# Patient Record
Sex: FEMALE | Race: WHITE | NOT HISPANIC OR LATINO | URBAN - METROPOLITAN AREA
[De-identification: names, ages, dates, MRNs, and addresses within clinical notes are randomized per-mention and may not be internally consistent; named-entity substitution may affect disease eponyms.]

---

## 2019-01-15 ENCOUNTER — INPATIENT (INPATIENT)
Facility: HOSPITAL | Age: 54
LOS: 1 days | Discharge: HOME | End: 2019-01-17
Attending: HOSPITALIST | Admitting: HOSPITALIST

## 2019-01-15 VITALS
HEART RATE: 104 BPM | RESPIRATION RATE: 18 BRPM | DIASTOLIC BLOOD PRESSURE: 91 MMHG | TEMPERATURE: 98 F | OXYGEN SATURATION: 98 % | SYSTOLIC BLOOD PRESSURE: 141 MMHG

## 2019-01-15 LAB
ALBUMIN SERPL ELPH-MCNC: 3.8 G/DL — SIGNIFICANT CHANGE UP (ref 3.5–5.2)
ALP SERPL-CCNC: 63 U/L — SIGNIFICANT CHANGE UP (ref 30–115)
ALT FLD-CCNC: 15 U/L — SIGNIFICANT CHANGE UP (ref 0–41)
ANION GAP SERPL CALC-SCNC: 16 MMOL/L — HIGH (ref 7–14)
AST SERPL-CCNC: 25 U/L — SIGNIFICANT CHANGE UP (ref 0–41)
BASOPHILS # BLD AUTO: 0.02 K/UL — SIGNIFICANT CHANGE UP (ref 0–0.2)
BASOPHILS NFR BLD AUTO: 0.4 % — SIGNIFICANT CHANGE UP (ref 0–1)
BILIRUB SERPL-MCNC: 0.5 MG/DL — SIGNIFICANT CHANGE UP (ref 0.2–1.2)
BUN SERPL-MCNC: 10 MG/DL — SIGNIFICANT CHANGE UP (ref 10–20)
CALCIUM SERPL-MCNC: 8.8 MG/DL — SIGNIFICANT CHANGE UP (ref 8.5–10.1)
CHLORIDE SERPL-SCNC: 92 MMOL/L — LOW (ref 98–110)
CO2 SERPL-SCNC: 23 MMOL/L — SIGNIFICANT CHANGE UP (ref 17–32)
CREAT SERPL-MCNC: 0.6 MG/DL — LOW (ref 0.7–1.5)
EOSINOPHIL # BLD AUTO: 0.02 K/UL — SIGNIFICANT CHANGE UP (ref 0–0.7)
EOSINOPHIL NFR BLD AUTO: 0.4 % — SIGNIFICANT CHANGE UP (ref 0–8)
GLUCOSE SERPL-MCNC: 110 MG/DL — HIGH (ref 70–99)
HCT VFR BLD CALC: 39.1 % — SIGNIFICANT CHANGE UP (ref 37–47)
HGB BLD-MCNC: 12.9 G/DL — SIGNIFICANT CHANGE UP (ref 12–16)
IMM GRANULOCYTES NFR BLD AUTO: 0.4 % — HIGH (ref 0.1–0.3)
LYMPHOCYTES # BLD AUTO: 0.58 K/UL — LOW (ref 1.2–3.4)
LYMPHOCYTES # BLD AUTO: 10.8 % — LOW (ref 20.5–51.1)
MAGNESIUM SERPL-MCNC: 1.7 MG/DL — LOW (ref 1.8–2.4)
MCHC RBC-ENTMCNC: 25.4 PG — LOW (ref 27–31)
MCHC RBC-ENTMCNC: 33 G/DL — SIGNIFICANT CHANGE UP (ref 32–37)
MCV RBC AUTO: 77 FL — LOW (ref 81–99)
MONOCYTES # BLD AUTO: 0.51 K/UL — SIGNIFICANT CHANGE UP (ref 0.1–0.6)
MONOCYTES NFR BLD AUTO: 9.5 % — HIGH (ref 1.7–9.3)
NEUTROPHILS # BLD AUTO: 4.24 K/UL — SIGNIFICANT CHANGE UP (ref 1.4–6.5)
NEUTROPHILS NFR BLD AUTO: 78.5 % — HIGH (ref 42.2–75.2)
NT-PROBNP SERPL-SCNC: 1970 PG/ML — HIGH (ref 0–300)
PLATELET # BLD AUTO: 251 K/UL — SIGNIFICANT CHANGE UP (ref 130–400)
POTASSIUM SERPL-MCNC: 4.2 MMOL/L — SIGNIFICANT CHANGE UP (ref 3.5–5)
POTASSIUM SERPL-SCNC: 4.2 MMOL/L — SIGNIFICANT CHANGE UP (ref 3.5–5)
PROT SERPL-MCNC: 7.6 G/DL — SIGNIFICANT CHANGE UP (ref 6–8)
RBC # BLD: 5.08 M/UL — SIGNIFICANT CHANGE UP (ref 4.2–5.4)
RBC # FLD: 14.9 % — HIGH (ref 11.5–14.5)
SODIUM SERPL-SCNC: 131 MMOL/L — LOW (ref 135–146)
TROPONIN T SERPL-MCNC: <0.01 NG/ML — SIGNIFICANT CHANGE UP
WBC # BLD: 5.39 K/UL — SIGNIFICANT CHANGE UP (ref 4.8–10.8)
WBC # FLD AUTO: 5.39 K/UL — SIGNIFICANT CHANGE UP (ref 4.8–10.8)

## 2019-01-15 RX ORDER — IPRATROPIUM/ALBUTEROL SULFATE 18-103MCG
3 AEROSOL WITH ADAPTER (GRAM) INHALATION ONCE
Qty: 0 | Refills: 0 | Status: COMPLETED | OUTPATIENT
Start: 2019-01-15 | End: 2019-01-15

## 2019-01-15 RX ORDER — MAGNESIUM SULFATE 500 MG/ML
2 VIAL (ML) INJECTION ONCE
Qty: 0 | Refills: 0 | Status: COMPLETED | OUTPATIENT
Start: 2019-01-15 | End: 2019-01-15

## 2019-01-15 RX ORDER — ASPIRIN/CALCIUM CARB/MAGNESIUM 324 MG
325 TABLET ORAL ONCE
Qty: 0 | Refills: 0 | Status: COMPLETED | OUTPATIENT
Start: 2019-01-15 | End: 2019-01-15

## 2019-01-15 RX ADMIN — Medication 50 GRAM(S): at 23:45

## 2019-01-15 RX ADMIN — Medication 3 MILLILITER(S): at 18:56

## 2019-01-15 RX ADMIN — Medication 125 MILLIGRAM(S): at 18:57

## 2019-01-15 RX ADMIN — Medication 3 MILLILITER(S): at 18:05

## 2019-01-15 RX ADMIN — Medication 325 MILLIGRAM(S): at 23:45

## 2019-01-15 RX ADMIN — Medication 3 MILLILITER(S): at 18:22

## 2019-01-15 NOTE — ED PROVIDER NOTE - OBJECTIVE STATEMENT
Pt is a 52 y/o female with hx of HTN, CAD s/p CABG, hypothyroidism, presents to ED for cough for 10 days, mild. Pt also notes mild SOB. + GÓMEZ, + orthopnea. + congestion, Tmax 101.1. No chest pain, abd pain, n/v/d. Pt was seen by PCP last week, just completed 5 day course of zithromax, but still c/o cough and SOB so came in for eval.

## 2019-01-15 NOTE — ED PROVIDER NOTE - MEDICAL DECISION MAKING DETAILS
53 y.o. female, PMH of HTN, CAD s/p CABG, hypothyroidism, c/p 9 day h/o cough. Finished a course of Z-pack with no relief. Today developed fever of 101F. No CP, abdominal pain, leg pain. Reports some SOB on exertion. On exam, pt in NAD, AAOx3, head NC/AT, CN II-XII intact, lungs diffuse wheezing B/L, CV S1S2 regular, abdomen soft/NT/ND/(+)BS, ext (-) edema. Labs/ CT/CXR reviewed. Findings suspicious for malignancy. Will admit for further work up.

## 2019-01-15 NOTE — ED PROVIDER NOTE - PHYSICAL EXAMINATION
Constitutional: Well developed, well nourished. NAD.  Head: Normocephalic, atraumatic.  Eyes: PERRL. EOMI.  ENT: No nasal discharge. Mucous membranes moist.  Neck: Supple. Painless ROM.  Cardiovascular: Normal S1, S2. Regular rate and rhythm. No murmurs, rubs, or gallops.  Pulmonary: Speaking in full sentences. Normal effort. Mild tachypnea. + scattered wheezing.   Abdominal: Soft. Nondistended. Nontender. No rebound, guarding, rigidity.  Extremities. Pelvis stable. Bilateral 1+ lower extremity edema, pt states is chronic.  Skin: No rashes, cyanosis.  Neuro: AAOx3. No focal neurological deficits.  Psych: Normal mood. Normal affect.

## 2019-01-15 NOTE — ED ADULT NURSE NOTE - NSIMPLEMENTINTERV_GEN_ALL_ED
Implemented All Universal Safety Interventions:  [place] to call system. Call bell, personal items and telephone within reach. Instruct patient to call for assistance. Room bathroom lighting operational. Non-slip footwear when patient is off stretcher. Physically safe environment: no spills, clutter or unnecessary equipment. Stretcher in lowest position, wheels locked, appropriate side rails in place.

## 2019-01-15 NOTE — ED PROVIDER NOTE - ATTENDING CONTRIBUTION TO CARE
53 y.o. female, PMH of HTN, CAD s/p CABG, hypothyroidism, c/p 9 day h/o cough. Finished a course of Z-pack with no relief. Today developed fever of 101F. No CP, abdominal pain, leg pain. Reports some SOB on exertion. On exam, pt in NAD, AAOx3, head NC/AT, CN II-XII intact, lungs diffuse wheezing B/L, CV S1S2 regular, abdomen soft/NT/ND/(+)BS, ext (-) edema. Will do labs, CXR, EKG and reevaluate.

## 2019-01-16 LAB
ALBUMIN SERPL ELPH-MCNC: 4 G/DL — SIGNIFICANT CHANGE UP (ref 3.5–5.2)
ALP SERPL-CCNC: 64 U/L — SIGNIFICANT CHANGE UP (ref 30–115)
ALT FLD-CCNC: 17 U/L — SIGNIFICANT CHANGE UP (ref 0–41)
ANION GAP SERPL CALC-SCNC: 18 MMOL/L — HIGH (ref 7–14)
APTT BLD: 29.8 SEC — SIGNIFICANT CHANGE UP (ref 27–39.2)
AST SERPL-CCNC: 25 U/L — SIGNIFICANT CHANGE UP (ref 0–41)
BASOPHILS # BLD AUTO: 0 K/UL — SIGNIFICANT CHANGE UP (ref 0–0.2)
BASOPHILS NFR BLD AUTO: 0 % — SIGNIFICANT CHANGE UP (ref 0–1)
BILIRUB SERPL-MCNC: 0.3 MG/DL — SIGNIFICANT CHANGE UP (ref 0.2–1.2)
BUN SERPL-MCNC: 11 MG/DL — SIGNIFICANT CHANGE UP (ref 10–20)
CALCIUM SERPL-MCNC: 9.3 MG/DL — SIGNIFICANT CHANGE UP (ref 8.5–10.1)
CHLORIDE SERPL-SCNC: 95 MMOL/L — LOW (ref 98–110)
CO2 SERPL-SCNC: 23 MMOL/L — SIGNIFICANT CHANGE UP (ref 17–32)
CREAT SERPL-MCNC: 0.7 MG/DL — SIGNIFICANT CHANGE UP (ref 0.7–1.5)
EOSINOPHIL # BLD AUTO: 0 K/UL — SIGNIFICANT CHANGE UP (ref 0–0.7)
EOSINOPHIL NFR BLD AUTO: 0 % — SIGNIFICANT CHANGE UP (ref 0–8)
GLUCOSE SERPL-MCNC: 213 MG/DL — HIGH (ref 70–99)
HCT VFR BLD CALC: 43.9 % — SIGNIFICANT CHANGE UP (ref 37–47)
HGB BLD-MCNC: 14.1 G/DL — SIGNIFICANT CHANGE UP (ref 12–16)
IMM GRANULOCYTES NFR BLD AUTO: 0.4 % — HIGH (ref 0.1–0.3)
INR BLD: 1.05 RATIO — SIGNIFICANT CHANGE UP (ref 0.65–1.3)
LACTATE SERPL-SCNC: 2.1 MMOL/L — SIGNIFICANT CHANGE UP (ref 0.5–2.2)
LYMPHOCYTES # BLD AUTO: 0.47 K/UL — LOW (ref 1.2–3.4)
LYMPHOCYTES # BLD AUTO: 19.9 % — LOW (ref 20.5–51.1)
MAGNESIUM SERPL-MCNC: 2.3 MG/DL — SIGNIFICANT CHANGE UP (ref 1.8–2.4)
MCHC RBC-ENTMCNC: 25 PG — LOW (ref 27–31)
MCHC RBC-ENTMCNC: 32.1 G/DL — SIGNIFICANT CHANGE UP (ref 32–37)
MCV RBC AUTO: 78 FL — LOW (ref 81–99)
MONOCYTES # BLD AUTO: 0.08 K/UL — LOW (ref 0.1–0.6)
MONOCYTES NFR BLD AUTO: 3.4 % — SIGNIFICANT CHANGE UP (ref 1.7–9.3)
NEUTROPHILS # BLD AUTO: 1.8 K/UL — SIGNIFICANT CHANGE UP (ref 1.4–6.5)
NEUTROPHILS NFR BLD AUTO: 76.3 % — HIGH (ref 42.2–75.2)
NRBC # BLD: 0 /100 WBCS — SIGNIFICANT CHANGE UP (ref 0–0)
PLATELET # BLD AUTO: 277 K/UL — SIGNIFICANT CHANGE UP (ref 130–400)
POTASSIUM SERPL-MCNC: 4.6 MMOL/L — SIGNIFICANT CHANGE UP (ref 3.5–5)
POTASSIUM SERPL-SCNC: 4.6 MMOL/L — SIGNIFICANT CHANGE UP (ref 3.5–5)
PROT SERPL-MCNC: 8.1 G/DL — HIGH (ref 6–8)
PROTHROM AB SERPL-ACNC: 12.1 SEC — SIGNIFICANT CHANGE UP (ref 9.95–12.87)
RBC # BLD: 5.63 M/UL — HIGH (ref 4.2–5.4)
RBC # FLD: 15.1 % — HIGH (ref 11.5–14.5)
SODIUM SERPL-SCNC: 136 MMOL/L — SIGNIFICANT CHANGE UP (ref 135–146)
WBC # BLD: 2.36 K/UL — LOW (ref 4.8–10.8)
WBC # FLD AUTO: 2.36 K/UL — LOW (ref 4.8–10.8)

## 2019-01-16 RX ORDER — LEVOTHYROXINE SODIUM 125 MCG
25 TABLET ORAL DAILY
Qty: 0 | Refills: 0 | Status: DISCONTINUED | OUTPATIENT
Start: 2019-01-16 | End: 2019-01-17

## 2019-01-16 RX ORDER — ENOXAPARIN SODIUM 100 MG/ML
140 INJECTION SUBCUTANEOUS
Qty: 0 | Refills: 0 | Status: DISCONTINUED | OUTPATIENT
Start: 2019-01-16 | End: 2019-01-17

## 2019-01-16 RX ORDER — CHLORHEXIDINE GLUCONATE 213 G/1000ML
1 SOLUTION TOPICAL
Qty: 0 | Refills: 0 | Status: DISCONTINUED | OUTPATIENT
Start: 2019-01-16 | End: 2019-01-17

## 2019-01-16 RX ORDER — AZITHROMYCIN 500 MG/1
500 TABLET, FILM COATED ORAL ONCE
Qty: 0 | Refills: 0 | Status: COMPLETED | OUTPATIENT
Start: 2019-01-16 | End: 2019-01-16

## 2019-01-16 RX ORDER — AZITHROMYCIN 500 MG/1
TABLET, FILM COATED ORAL
Qty: 0 | Refills: 0 | Status: DISCONTINUED | OUTPATIENT
Start: 2019-01-16 | End: 2019-01-17

## 2019-01-16 RX ORDER — METOPROLOL TARTRATE 50 MG
25 TABLET ORAL EVERY 6 HOURS
Qty: 0 | Refills: 0 | Status: DISCONTINUED | OUTPATIENT
Start: 2019-01-16 | End: 2019-01-17

## 2019-01-16 RX ORDER — IPRATROPIUM/ALBUTEROL SULFATE 18-103MCG
3 AEROSOL WITH ADAPTER (GRAM) INHALATION EVERY 6 HOURS
Qty: 0 | Refills: 0 | Status: DISCONTINUED | OUTPATIENT
Start: 2019-01-16 | End: 2019-01-17

## 2019-01-16 RX ORDER — CEFTRIAXONE 500 MG/1
INJECTION, POWDER, FOR SOLUTION INTRAMUSCULAR; INTRAVENOUS
Qty: 0 | Refills: 0 | Status: DISCONTINUED | OUTPATIENT
Start: 2019-01-16 | End: 2019-01-17

## 2019-01-16 RX ORDER — AZITHROMYCIN 500 MG/1
500 TABLET, FILM COATED ORAL EVERY 24 HOURS
Qty: 0 | Refills: 0 | Status: DISCONTINUED | OUTPATIENT
Start: 2019-01-17 | End: 2019-01-17

## 2019-01-16 RX ORDER — CEFTRIAXONE 500 MG/1
1 INJECTION, POWDER, FOR SOLUTION INTRAMUSCULAR; INTRAVENOUS ONCE
Qty: 0 | Refills: 0 | Status: COMPLETED | OUTPATIENT
Start: 2019-01-16 | End: 2019-01-16

## 2019-01-16 RX ORDER — CEFTRIAXONE 500 MG/1
1 INJECTION, POWDER, FOR SOLUTION INTRAMUSCULAR; INTRAVENOUS EVERY 24 HOURS
Qty: 0 | Refills: 0 | Status: DISCONTINUED | OUTPATIENT
Start: 2019-01-17 | End: 2019-01-17

## 2019-01-16 RX ORDER — ACETAMINOPHEN 500 MG
650 TABLET ORAL EVERY 6 HOURS
Qty: 0 | Refills: 0 | Status: DISCONTINUED | OUTPATIENT
Start: 2019-01-16 | End: 2019-01-17

## 2019-01-16 RX ORDER — SIMVASTATIN 20 MG/1
20 TABLET, FILM COATED ORAL AT BEDTIME
Qty: 0 | Refills: 0 | Status: DISCONTINUED | OUTPATIENT
Start: 2019-01-16 | End: 2019-01-17

## 2019-01-16 RX ADMIN — Medication 60 MILLIGRAM(S): at 21:46

## 2019-01-16 RX ADMIN — Medication 25 MILLIGRAM(S): at 12:17

## 2019-01-16 RX ADMIN — SIMVASTATIN 20 MILLIGRAM(S): 20 TABLET, FILM COATED ORAL at 21:46

## 2019-01-16 RX ADMIN — Medication 3 MILLILITER(S): at 20:26

## 2019-01-16 RX ADMIN — ENOXAPARIN SODIUM 140 MILLIGRAM(S): 100 INJECTION SUBCUTANEOUS at 17:14

## 2019-01-16 RX ADMIN — AZITHROMYCIN 255 MILLIGRAM(S): 500 TABLET, FILM COATED ORAL at 06:24

## 2019-01-16 RX ADMIN — ENOXAPARIN SODIUM 140 MILLIGRAM(S): 100 INJECTION SUBCUTANEOUS at 07:19

## 2019-01-16 RX ADMIN — Medication 25 MILLIGRAM(S): at 17:13

## 2019-01-16 RX ADMIN — Medication 25 MILLIGRAM(S): at 06:24

## 2019-01-16 RX ADMIN — CEFTRIAXONE 100 GRAM(S): 500 INJECTION, POWDER, FOR SOLUTION INTRAMUSCULAR; INTRAVENOUS at 01:37

## 2019-01-16 RX ADMIN — Medication 25 MICROGRAM(S): at 06:24

## 2019-01-16 RX ADMIN — Medication 25 MILLIGRAM(S): at 23:32

## 2019-01-16 RX ADMIN — AZITHROMYCIN 255 MILLIGRAM(S): 500 TABLET, FILM COATED ORAL at 23:25

## 2019-01-16 NOTE — CONSULT NOTE ADULT - SUBJECTIVE AND OBJECTIVE BOX
Patient is a 53y old  Female who presents with a chief complaint of shortness of breath and cough (16 Jan 2019 00:19)      HPI:  53 y.o. f w/ PMHx of HTN, CAD (s/p CABG), Hypothyroidism, DLD presents w/ a chief complaint of nonresolving dry nonproductive cough and shortness of breath on exertion associated w/ fevers. Patient had a temp of 101F at home. Patient went to go see PMD last week and completed the z-pack but still had no resolution of symptoms.     In ED- CXR showed cavitary lesions so a CT Chest was done. It showed findings are concerning for malignancy such as small cell carcinoma and a right hilar lymph node with likely necrotic center as described above correlates with chest x-ray findings. In addition, found to be in new-onset afib w/ rvr. (16 Jan 2019 00:19)      PAST MEDICAL & SURGICAL HISTORY:  Hypothyroid  High cholesterol  Heart disease: TRIPLE BYPASS  No significant past surgical history      SOCIAL HX:   Smoking ex-smoker                        ETOH                            Other    FAMILY HISTORY:  Family history of lung cancer (Uncle)  .  No cardiovascular or pulmonary family history     Review of System:  See HPI    Allergies    No Known Allergies    Intolerances          PHYSICAL EXAM  Vital Signs Last 24 Hrs  T(C): 35.7 (16 Jan 2019 07:46), Max: 36.7 (15 Deandre 2019 23:38)  T(F): 96.3 (16 Jan 2019 07:46), Max: 98 (15 Deandre 2019 23:38)  HR: 56 (16 Jan 2019 07:46) (56 - 104)  BP: 119/75 (16 Jan 2019 07:46) (113/73 - 151/83)  RR: 18 (16 Jan 2019 07:46) (18 - 20)  SpO2: 97% (16 Jan 2019 07:46) (97% - 98%)    General: In NAD  HEENT: STEVE             Lymphatic system: No cervical LN     Lungs: Bilateral rhonchi and wheezing  Cardiovascular: Regular  Gastrointestinal: Soft.  + BS   Musculoskeletal: No Clubbing.  Full range of motion.. Moves all extremities  Skin: Warm.  Intact  Neurological: No motor or sensory deficit       LABS:                          14.1   2.36  )-----------( 277      ( 16 Jan 2019 07:07 )             43.9                                               01-16    136  |  95<L>  |  11  ----------------------------<  213<H>  4.6   |  23  |  0.7    Ca    9.3      16 Jan 2019 07:07  Mg     2.3     01-16    TPro  8.1<H>  /  Alb  4.0  /  TBili  0.3  /  DBili  x   /  AST  25  /  ALT  17  /  AlkPhos  64  01-16      PT/INR - ( 16 Jan 2019 07:07 )   PT: 12.10 sec;   INR: 1.05 ratio         PTT - ( 16 Jan 2019 07:07 )  PTT:29.8 sec                                           CARDIAC MARKERS ( 15 Deandre 2019 18:30 )  x     / <0.01 ng/mL / x     / x     / x                                                LIVER FUNCTIONS - ( 16 Jan 2019 07:07 )  Alb: 4.0 g/dL / Pro: 8.1 g/dL / ALK PHOS: 64 U/L / ALT: 17 U/L / AST: 25 U/L / GGT: x                                                                                                MEDICATIONS  (STANDING):  ALBUTerol/ipratropium for Nebulization 3 milliLiter(s) Nebulizer every 6 hours  azithromycin  IVPB      cefTRIAXone   IVPB      chlorhexidine 2% Cloths 1 Application(s) Topical <User Schedule>  enoxaparin Injectable 140 milliGRAM(s) SubCutaneous two times a day  levothyroxine 25 MICROGram(s) Oral daily  metoprolol tartrate 25 milliGRAM(s) Oral every 6 hours  simvastatin 20 milliGRAM(s) Oral at bedtime    MEDICATIONS  (PRN):  acetaminophen   Tablet .. 650 milliGRAM(s) Oral every 6 hours PRN Temp greater or equal to 38C (100.4F)

## 2019-01-16 NOTE — H&P ADULT - NSHPPHYSICALEXAM_GEN_ALL_CORE
Constitutional: Well developed, well nourished. NAD.  Head: Normocephalic, atraumatic.  Eyes: PERRL. EOMI.  ENT: No nasal discharge. Mucous membranes moist.  Neck: Supple. Painless ROM.  Cardiovascular: Normal S1, S2. Regular rate and rhythm. No murmurs, rubs, or gallops.  Pulmonary: Speaking in full sentences. Normal effort. CTA B/L   Abdominal: Soft. Nondistended. Nontender. No rebound, guarding, rigidity.  Extremities. Pelvis stable. Bilateral 1+ lower extremity edema, pt states is chronic.  Skin: No rashes, cyanosis.  Neuro: AAOx3. No focal neurological deficits.  Psych: Normal mood. Normal affect.

## 2019-01-16 NOTE — H&P ADULT - ASSESSMENT
53 y.o. f w/ PMHx of HTN, CAD (s/p CABG), Hypothyroidism, DLD presents w/ a chief complaint of nonresolving dry nonproductive cough and shortness of breath on exertion associated w/ fevers. CT Chest in ED showed findings concerning for malignancy.    #) R/o Lung Ca  -CT Chest (01/15/2019)- 1.  Extensive mediastinal, bilateral hilar, and right supraclavicular lymphadenopathy as described above. Additionally, there is narrowing of   the right upper lobe bronchus. Findings are concerning for malignancy such as small cell carcinoma. PET/CT recommended for further evaluation.2.  Right hilar lymph node with likely necrotic center as described above correlates with chest x-ray findings.  -likely small cell lung cancer in light of CT Chest findings and euvolemic hyponatremia  -need biopsy to further evaluate malignancy, consider IR c/s for supraclavicular lymph node biopsy    #) Community-Acquired Pneumonia  -positive CXR, fevers (101F), cough/SOB  -started azithromycin and ceftriaxone    #) New Onset Afib  -admit to tele  -started metoprolol 25 q6h w/ holding parameter of hold if SBP<100  -started AC w/ lovenox for now because of possible procedure plans    #) HTN  -c/w home med    #) Hypothyroidism  -c/w home med (synthroid)    #) CAD s/p CABG/ DLD  -c/w home med (simvastatin)    #) DVT/ GI ppx  -lovenox, protonix started (might help with cough)    #) Code Status  -full code    #) Dispo  -from home 53 y.o. f w/ PMHx of HTN, CAD (s/p CABG), Hypothyroidism, DLD presents w/ a chief complaint of nonresolving dry nonproductive cough and shortness of breath on exertion associated w/ fevers. CT Chest in ED showed findings concerning for malignancy.    #) R/o Lung Ca  -CT Chest (01/15/2019)- 1.  Extensive mediastinal, bilateral hilar, and right supraclavicular lymphadenopathy as described above. Additionally, there is narrowing of   the right upper lobe bronchus. Findings are concerning for malignancy such as small cell carcinoma. PET/CT recommended for further evaluation.2.  Right hilar lymph node with likely necrotic center as described above correlates with chest x-ray findings.  -former smoker, quit 30 years ago, smoked 1/2 ppd for 10 years  -uncle had lung cancer  -likely small cell lung cancer in light of CT Chest findings and euvolemic hyponatremia  -need biopsy to further evaluate malignancy, consider IR c/s for supraclavicular lymph node biopsy    #) Community-Acquired Pneumonia  -positive CXR, fevers (101F), cough/SOB  -started azithromycin and ceftriaxone    #) New Onset Afib  -admit to tele  -started metoprolol 25 q6h w/ holding parameter of hold if SBP<100  -started AC w/ lovenox for now because of possible procedure plans  -2D Echo ordered    #) HTN  -c/w home med    #) Hypothyroidism  -c/w home med (synthroid)    #) CAD s/p CABG/ DLD  -c/w home med (simvastatin)    #) DVT/ GI ppx  -lovenox, protonix started (might help with cough)    #) Code Status  -full code    #) Dispo  -from home

## 2019-01-16 NOTE — H&P ADULT - FAMILY HISTORY
No pertinent family history in first degree relatives Uncle  Still living? Unknown  Family history of lung cancer, Age at diagnosis: Age Unknown

## 2019-01-16 NOTE — CONSULT NOTE ADULT - SUBJECTIVE AND OBJECTIVE BOX
INTERVENTIONAL RADIOLOGY CONSULT:     Procedure Requested:     HPI:  53 y.o. f w/ PMHx of HTN, CAD (s/p CABG), Hypothyroidism, DLD presents w/ a chief complaint of nonresolving dry nonproductive cough and shortness of breath on exertion associated w/ fevers. Patient had a temp of 101F at home. Patient went to go see PMD last week and completed the z-pack but still had no resolution of symptoms.     In ED- CXR showed cavitary lesions so a CT Chest was done. It showed findings are concerning for malignancy such as small cell carcinoma and a right hilar lymph node with likely necrotic center as described above correlates with chest x-ray findings. In addition, found to be in new-onset afib w/ rvr. (16 Jan 2019 00:19)      PAST MEDICAL & SURGICAL HISTORY:  Hypothyroid  High cholesterol  Heart disease: TRIPLE BYPASS  No significant past surgical history      MEDICATIONS  (STANDING):  ALBUTerol/ipratropium for Nebulization 3 milliLiter(s) Nebulizer every 6 hours  azithromycin  IVPB      cefTRIAXone   IVPB      chlorhexidine 2% Cloths 1 Application(s) Topical <User Schedule>  enoxaparin Injectable 140 milliGRAM(s) SubCutaneous two times a day  levothyroxine 25 MICROGram(s) Oral daily  methylPREDNISolone sodium succinate Injectable 60 milliGRAM(s) IV Push every 8 hours  metoprolol tartrate 25 milliGRAM(s) Oral every 6 hours  simvastatin 20 milliGRAM(s) Oral at bedtime    MEDICATIONS  (PRN):  acetaminophen   Tablet .. 650 milliGRAM(s) Oral every 6 hours PRN Temp greater or equal to 38C (100.4F)      Allergies    No Known Allergies    Intolerances        Social History:   Smoking: Yes [ ]  No [ ]   ______pk yrs  ETOH  Yes [ ]  No [ ]  Social [ ]  DRUGS:  Yes [ ]  No [ ]  if so what______________    FAMILY HISTORY:  Family history of lung cancer (Uncle)      Physical Exam:   Vital Signs Last 24 Hrs  T(C): 35.6 (16 Jan 2019 17:10), Max: 36.7 (15 Deandre 2019 23:38)  T(F): 96 (16 Jan 2019 17:10), Max: 98 (15 Deandre 2019 23:38)  HR: 93 (16 Jan 2019 17:10) (56 - 93)  BP: 112/78 (16 Jan 2019 17:10) (112/78 - 151/83)  BP(mean): --  RR: 18 (16 Jan 2019 17:10) (18 - 20)  SpO2: 97% (16 Jan 2019 17:10) (97% - 98%)    General:     Lungs:    Cardiovascular:     Abdomen:    Extremities:    Musculoskeletal:    Neuro/Psych:        Labs:                         14.1   2.36  )-----------( 277      ( 16 Jan 2019 07:07 )             43.9     01-16    136  |  95<L>  |  11  ----------------------------<  213<H>  4.6   |  23  |  0.7    Ca    9.3      16 Jan 2019 07:07  Mg     2.3     01-16    TPro  8.1<H>  /  Alb  4.0  /  TBili  0.3  /  DBili  x   /  AST  25  /  ALT  17  /  AlkPhos  64  01-16    PT/INR - ( 16 Jan 2019 07:07 )   PT: 12.10 sec;   INR: 1.05 ratio         PTT - ( 16 Jan 2019 07:07 )  PTT:29.8 sec    Pertinent labs:                      14.1   2.36  )-----------( 277      ( 16 Jan 2019 07:07 )             43.9       01-16    136  |  95<L>  |  11  ----------------------------<  213<H>  4.6   |  23  |  0.7    Ca    9.3      16 Jan 2019 07:07  Mg     2.3     01-16    TPro  8.1<H>  /  Alb  4.0  /  TBili  0.3  /  DBili  x   /  AST  25  /  ALT  17  /  AlkPhos  64  01-16      PT/INR - ( 16 Jan 2019 07:07 )   PT: 12.10 sec;   INR: 1.05 ratio         PTT - ( 16 Jan 2019 07:07 )  PTT:29.8 sec    Radiology & Additional Studies: Ct chest    Radiology imaging reviewed.       ASSESSMENT/ PLAN:     Multiple enlarged mediastinal lymph nodes, recommend endobronchial biopsy.      Risks, benefits, and alternatives to treatment discussed. All questions answered with understanding.    Thank you for the courtesy of this consult, please call [phone] with any further questions. INTERVENTIONAL RADIOLOGY CONSULT:     Procedure Requested:     HPI:  53 y.o. f w/ PMHx of HTN, CAD (s/p CABG), Hypothyroidism, DLD presents w/ a chief complaint of nonresolving dry nonproductive cough and shortness of breath on exertion associated w/ fevers. Patient had a temp of 101F at home. Patient went to go see PMD last week and completed the z-pack but still had no resolution of symptoms.     In ED- CXR showed cavitary lesions so a CT Chest was done. It showed findings are concerning for malignancy such as small cell carcinoma and a right hilar lymph node with likely necrotic center as described above correlates with chest x-ray findings. In addition, found to be in new-onset afib w/ rvr. (16 Jan 2019 00:19)      PAST MEDICAL & SURGICAL HISTORY:  Hypothyroid  High cholesterol  Heart disease: TRIPLE BYPASS  No significant past surgical history      MEDICATIONS  (STANDING):  ALBUTerol/ipratropium for Nebulization 3 milliLiter(s) Nebulizer every 6 hours  azithromycin  IVPB      cefTRIAXone   IVPB      chlorhexidine 2% Cloths 1 Application(s) Topical <User Schedule>  enoxaparin Injectable 140 milliGRAM(s) SubCutaneous two times a day  levothyroxine 25 MICROGram(s) Oral daily  methylPREDNISolone sodium succinate Injectable 60 milliGRAM(s) IV Push every 8 hours  metoprolol tartrate 25 milliGRAM(s) Oral every 6 hours  simvastatin 20 milliGRAM(s) Oral at bedtime    MEDICATIONS  (PRN):  acetaminophen   Tablet .. 650 milliGRAM(s) Oral every 6 hours PRN Temp greater or equal to 38C (100.4F)      Allergies    No Known Allergies    Intolerances        Social History:   Smoking: Yes [ ]  No [ ]   ______pk yrs  ETOH  Yes [ ]  No [ ]  Social [ ]  DRUGS:  Yes [ ]  No [ ]  if so what______________    FAMILY HISTORY:  Family history of lung cancer (Uncle)      Physical Exam:   Vital Signs Last 24 Hrs  T(C): 35.6 (16 Jan 2019 17:10), Max: 36.7 (15 Deandre 2019 23:38)  T(F): 96 (16 Jan 2019 17:10), Max: 98 (15 Deandre 2019 23:38)  HR: 93 (16 Jan 2019 17:10) (56 - 93)  BP: 112/78 (16 Jan 2019 17:10) (112/78 - 151/83)  BP(mean): --  RR: 18 (16 Jan 2019 17:10) (18 - 20)  SpO2: 97% (16 Jan 2019 17:10) (97% - 98%)        Labs:                         14.1   2.36  )-----------( 277      ( 16 Jan 2019 07:07 )             43.9     01-16    136  |  95<L>  |  11  ----------------------------<  213<H>  4.6   |  23  |  0.7    Ca    9.3      16 Jan 2019 07:07  Mg     2.3     01-16    TPro  8.1<H>  /  Alb  4.0  /  TBili  0.3  /  DBili  x   /  AST  25  /  ALT  17  /  AlkPhos  64  01-16    PT/INR - ( 16 Jan 2019 07:07 )   PT: 12.10 sec;   INR: 1.05 ratio         PTT - ( 16 Jan 2019 07:07 )  PTT:29.8 sec    Pertinent labs:                      14.1   2.36  )-----------( 277      ( 16 Jan 2019 07:07 )             43.9       01-16    136  |  95<L>  |  11  ----------------------------<  213<H>  4.6   |  23  |  0.7    Ca    9.3      16 Jan 2019 07:07  Mg     2.3     01-16    TPro  8.1<H>  /  Alb  4.0  /  TBili  0.3  /  DBili  x   /  AST  25  /  ALT  17  /  AlkPhos  64  01-16      PT/INR - ( 16 Jan 2019 07:07 )   PT: 12.10 sec;   INR: 1.05 ratio         PTT - ( 16 Jan 2019 07:07 )  PTT:29.8 sec    Radiology & Additional Studies: Ct chest    Radiology imaging reviewed.       ASSESSMENT/ PLAN:     Multiple enlarged mediastinal lymph nodes, recommend endobronchial biopsy.  No obvious percutaneous window to safely biopsy these lymph nodes by IR      Risks, benefits, and alternatives to treatment discussed. All questions answered with understanding.    Thank you for the courtesy of this consult, please call [phone] with any further questions.

## 2019-01-16 NOTE — H&P ADULT - NSHPSOCIALHISTORY_GEN_ALL_CORE
Denies x3 Former smoker, quit 30 years ago, smoked 1/2 ppd for 10 years  Denies alcohol and illicit drug use  Works as   Denies recent travel

## 2019-01-16 NOTE — H&P ADULT - NSHPLABSRESULTS_GEN_ALL_CORE
12.9   5.39  )-----------( 251      ( 15 Deandre 2019 18:30 )             39.1       01-15    131<L>  |  92<L>  |  10  ----------------------------<  110<H>  4.2   |  23  |  0.6<L>    Ca    8.8      15 Deandre 2019 18:30  Mg     1.7     01-15    TPro  7.6  /  Alb  3.8  /  TBili  0.5  /  DBili  x   /  AST  25  /  ALT  15  /  AlkPhos  63  01-15      LIVER FUNCTIONS - ( 15 Deandre 2019 18:30 )  Alb: 3.8 g/dL / Pro: 7.6 g/dL / ALK PHOS: 63 U/L / ALT: 15 U/L / AST: 25 U/L / GGT: x           CARDIAC MARKERS ( 15 Deandre 2019 18:30 )  x     / <0.01 ng/mL / x     / x     / x        < from: Xray Chest 1 View-PORTABLE IMMEDIATE (01.15.19 @ 18:28) >      Right hilar 2.3 cm cavitary mass.Please see concurrent CT chest   regarding further details.    < end of copied text >    < from: CT Chest w/ IV Cont (01.15.19 @ 21:31) >    1.  Extensive mediastinal, bilateral hilar, and right supraclavicular   lymphadenopathy as described above. Additionally, there is narrowing of   the right upper lobe bronchus. Findings are concerning for malignancy   such as small cell carcinoma. PET/CT recommended for further evaluation.  2.  Right hilar lymph node with likely necrotic center as described above   correlates with chest x-ray findings.    < end of copied text >

## 2019-01-16 NOTE — H&P ADULT - HISTORY OF PRESENT ILLNESS
53 y.o. f w/ PMHx of HTN, CAD (s/p CABG), Hypothyroidism, DLD presents w/ a chief complaint of nonresolving dry nonproductive cough and shortness of breath on exertion associated w/ fevers. Patient had a temp of 101F at home. Patient went to go see PMD last week and completed the z-pack but still had no resolution of symptoms.     In ED- CXR showed cavitary lesions so a CT Chest was done. It showed findings are concerning for malignancy such as small cell carcinoma and a right hilar lymph node with likely necrotic center as described above correlates with chest x-ray findings. In addition, found to be in new-onset afib w/ rvr.

## 2019-01-16 NOTE — H&P ADULT - ATTENDING COMMENTS
HPI as above.  Pt seen and examined at bedside independently.   Vital Signs (24 Hrs):  T(C): 35.7 (01-16-19 @ 07:46), Max: 36.7 (01-15-19 @ 23:38)  HR: 56 (01-16-19 @ 07:46) (56 - 104)  BP: 119/75 (01-16-19 @ 07:46) (113/73 - 151/83)  RR: 18 (01-16-19 @ 07:46) (18 - 20)  SpO2: 97% (01-16-19 @ 07:46) (97% - 98%)  Wt(kg): --  Daily     Daily     I&O's Summary    Exam mild wheeze on exam   Labs reviewed  Plan continue current management as above  Suspected maliganancy- follow up pulm team  Afib with RVR- chadvas >2 will need AC, rate controlled, outpt sleep study, fu cardiology

## 2019-01-16 NOTE — CONSULT NOTE ADULT - ASSESSMENT
IMPRESSION:  Post-infectious hyperactive airway disease  Right hilar lymphadenopathy  New onset Afib with RVR  ?ELVIN    RECOMMENDATION?  Check RVP  Solumedrol 60mg q8h  Consider Cardiology c/s  Will consider bronchoscopy with EBUS when respiratory status improves  DVT ppx  OOB as tolerated IMPRESSION:  Post-infectious hyperactive airway disease  Mediastinal LN   New onset Afib with RVR.  Now rate controlled   Possible ELVIN    RECOMMENDATION?  Check RVP  Solumedrol 60mg q8h  Nebs PRN  Consider Cardiology c/s  Will consider bronchoscopy with EBUS when respiratory status improves  DVT ppx  OOB as tolerated  OP pulmonary follow up

## 2019-01-17 ENCOUNTER — TRANSCRIPTION ENCOUNTER (OUTPATIENT)
Age: 54
End: 2019-01-17

## 2019-01-17 VITALS
DIASTOLIC BLOOD PRESSURE: 74 MMHG | RESPIRATION RATE: 18 BRPM | SYSTOLIC BLOOD PRESSURE: 125 MMHG | HEART RATE: 77 BPM | OXYGEN SATURATION: 99 %

## 2019-01-17 LAB
ANION GAP SERPL CALC-SCNC: 15 MMOL/L — HIGH (ref 7–14)
BUN SERPL-MCNC: 19 MG/DL — SIGNIFICANT CHANGE UP (ref 10–20)
CALCIUM SERPL-MCNC: 9.2 MG/DL — SIGNIFICANT CHANGE UP (ref 8.5–10.1)
CHLORIDE SERPL-SCNC: 95 MMOL/L — LOW (ref 98–110)
CO2 SERPL-SCNC: 25 MMOL/L — SIGNIFICANT CHANGE UP (ref 17–32)
CREAT SERPL-MCNC: 0.7 MG/DL — SIGNIFICANT CHANGE UP (ref 0.7–1.5)
FLUAV H3 RNA SPEC QL NAA+PROBE: DETECTED
GLUCOSE SERPL-MCNC: 154 MG/DL — HIGH (ref 70–99)
HCT VFR BLD CALC: 45.3 % — SIGNIFICANT CHANGE UP (ref 37–47)
HGB BLD-MCNC: 14.1 G/DL — SIGNIFICANT CHANGE UP (ref 12–16)
MCHC RBC-ENTMCNC: 24.7 PG — LOW (ref 27–31)
MCHC RBC-ENTMCNC: 31.1 G/DL — LOW (ref 32–37)
MCV RBC AUTO: 79.3 FL — LOW (ref 81–99)
NRBC # BLD: 0 /100 WBCS — SIGNIFICANT CHANGE UP (ref 0–0)
PLATELET # BLD AUTO: 328 K/UL — SIGNIFICANT CHANGE UP (ref 130–400)
POTASSIUM SERPL-MCNC: 4.7 MMOL/L — SIGNIFICANT CHANGE UP (ref 3.5–5)
POTASSIUM SERPL-SCNC: 4.7 MMOL/L — SIGNIFICANT CHANGE UP (ref 3.5–5)
RAPID RVP RESULT: DETECTED
RBC # BLD: 5.71 M/UL — HIGH (ref 4.2–5.4)
RBC # FLD: 15.4 % — HIGH (ref 11.5–14.5)
SODIUM SERPL-SCNC: 135 MMOL/L — SIGNIFICANT CHANGE UP (ref 135–146)
WBC # BLD: 3.88 K/UL — LOW (ref 4.8–10.8)
WBC # FLD AUTO: 3.88 K/UL — LOW (ref 4.8–10.8)

## 2019-01-17 RX ORDER — METOPROLOL TARTRATE 50 MG
1 TABLET ORAL
Qty: 0 | Refills: 0 | DISCHARGE
Start: 2019-01-17

## 2019-01-17 RX ORDER — AZITHROMYCIN 500 MG/1
0 TABLET, FILM COATED ORAL
Qty: 6 | Refills: 0 | COMMUNITY

## 2019-01-17 RX ORDER — PROMETHAZINE HCL/CODEINE
0 SYRUP ORAL
Qty: 200 | Refills: 0 | COMMUNITY

## 2019-01-17 RX ORDER — APIXABAN 2.5 MG/1
1 TABLET, FILM COATED ORAL
Qty: 60 | Refills: 1
Start: 2019-01-17 | End: 2019-03-17

## 2019-01-17 RX ORDER — ALBUTEROL 90 UG/1
2 AEROSOL, METERED ORAL
Qty: 16 | Refills: 0 | OUTPATIENT
Start: 2019-01-17 | End: 2019-01-23

## 2019-01-17 RX ORDER — LEVOTHYROXINE SODIUM 125 MCG
0 TABLET ORAL
Qty: 30 | Refills: 0 | COMMUNITY

## 2019-01-17 RX ADMIN — ENOXAPARIN SODIUM 140 MILLIGRAM(S): 100 INJECTION SUBCUTANEOUS at 06:32

## 2019-01-17 RX ADMIN — Medication 25 MILLIGRAM(S): at 12:05

## 2019-01-17 RX ADMIN — Medication 25 MILLIGRAM(S): at 05:36

## 2019-01-17 RX ADMIN — Medication 3 MILLILITER(S): at 08:38

## 2019-01-17 RX ADMIN — Medication 60 MILLIGRAM(S): at 05:37

## 2019-01-17 RX ADMIN — CEFTRIAXONE 100 GRAM(S): 500 INJECTION, POWDER, FOR SOLUTION INTRAMUSCULAR; INTRAVENOUS at 00:35

## 2019-01-17 RX ADMIN — Medication 25 MICROGRAM(S): at 05:37

## 2019-01-17 NOTE — DISCHARGE NOTE ADULT - CARE PROVIDER_API CALL
Tulio Guerra), Medicine  [facility] [place] [address]  Phone: [phone]  Fax: [phone]    Isidoro Abdullahi), Critical Care Medicine; Pulmonary Disease; Sleep Medicine  [address] [place] [address]  Phone: [phone]  Fax: [phone]    Javier Roth), Cardiovascular Disease; Interventional Cardiology  [address] [place] [address]  Phone: [phone]  Fax: [phone]

## 2019-01-17 NOTE — DISCHARGE NOTE ADULT - MEDICATION SUMMARY - MEDICATIONS TO TAKE
I will START or STAY ON the medications listed below when I get home from the hospital:    predniSONE 20 mg oral tablet  -- 2 tab(s) by mouth once a day   -- It is very important that you take or use this exactly as directed.  Do not skip doses or discontinue unless directed by your doctor.  Obtain medical advice before taking any non-prescription drugs as some may affect the action of this medication.  Take with food or milk.    -- Indication: For Wheeze    apixaban 5 mg oral tablet  -- 1 tab(s) by mouth 2 times a day   -- Check with your doctor before becoming pregnant.  It is very important that you take or use this exactly as directed.  Do not skip doses or discontinue unless directed by your doctor.  Obtain medical advice before taking any non-prescription drugs as some may affect the action of this medication.    -- Indication: For Atrial fibrillation    SIMVASTATIN 20 MG TABLET  -- Indication: For High cholesterol    metoprolol tartrate 25 mg oral tablet  -- 1 tab(s) by mouth 2 times a day  -- Indication: For atrial fibrillation    Ventolin HFA 90 mcg/inh inhalation aerosol  -- 2 puff(s) inhaled 4 times a day MDD:as needed for wheezing  -- For inhalation only.  It is very important that you take or use this exactly as directed.  Do not skip doses or discontinue unless directed by your doctor.  Obtain medical advice before taking any non-prescription drugs as some may affect the action of this medication.  Shake well before use.    -- Indication: For Wheeze

## 2019-01-17 NOTE — DISCHARGE NOTE ADULT - CARE PLAN
Principal Discharge DX:	Lung mass  Goal:	Diagnosis with biopsy as outpatient  Assessment and plan of treatment:	You were found to have cavitary lesion in right lung. For definitive diagnosis you will need a bronchoscopy and biopsy to be done by pulmonologist. Please follow up with Dr. Abdullahi for this  Secondary Diagnosis:	Atrial fibrillation, unspecified type  Goal:	maintain normal rate or normal rhythm  Assessment and plan of treatment:	Your were started on blood thinner, and started on metoprolol for controlling your heart rate. Please take these medication and follow up with your heart doctor  Secondary Diagnosis:	Influenza A  Goal:	Resolution of symtpom  Assessment and plan of treatment:	You were positive for influenza A. Please take plenty of fluid. You also had some wheezing so take ventolin puffs as needed and steroid for 5 days

## 2019-01-17 NOTE — PROGRESS NOTE ADULT - SUBJECTIVE AND OBJECTIVE BOX
Pt seen and examined at bedside. No CP or SOB.    PAST MEDICAL & SURGICAL HISTORY:  Hypothyroid  High cholesterol  Heart disease: TRIPLE BYPASS  No significant past surgical history      VITAL SIGNS (Last 24 hrs):  T(C): 36.3 (01-16-19 @ 23:49), Max: 36.3 (01-16-19 @ 23:49)  HR: 77 (01-17-19 @ 08:04) (69 - 93)  BP: 125/74 (01-17-19 @ 08:04) (112/78 - 125/74)  RR: 18 (01-17-19 @ 08:04) (18 - 18)  SpO2: 99% (01-17-19 @ 08:04) (97% - 99%)  Wt(kg): --  Daily     Daily     I&O's Summary      PHYSICAL EXAM:  GENERAL: NAD, well-developed  HEAD:  Atraumatic, Normocephalic  EYES: EOMI, PERRLA, conjunctiva and sclera clear  NECK: Supple, No JVD  CHEST/LUNG: Clear to auscultation bilaterally; No wheeze  HEART: Regular rate and rhythm; No murmurs, rubs, or gallops  ABDOMEN: Soft, Nontender, Nondistended; Bowel sounds present  EXTREMITIES:  2+ Peripheral Pulses, No clubbing, cyanosis, or edema  PSYCH: AAOx3  NEUROLOGY: non-focal  SKIN: No rashes or lesions    Labs Reviewed  Spoke to patient in regards to abnormal labs.    CBC Full  -  ( 17 Jan 2019 06:10 )  WBC Count : 3.88 K/uL  Hemoglobin : 14.1 g/dL  Hematocrit : 45.3 %  Platelet Count - Automated : 328 K/uL  Mean Cell Volume : 79.3 fL  Mean Cell Hemoglobin : 24.7 pg  Mean Cell Hemoglobin Concentration : 31.1 g/dL  Auto Neutrophil # : x  Auto Lymphocyte # : x  Auto Monocyte # : x  Auto Eosinophil # : x  Auto Basophil # : x  Auto Neutrophil % : x  Auto Lymphocyte % : x  Auto Monocyte % : x  Auto Eosinophil % : x  Auto Basophil % : x    BMP:    01-17 @ 06:10    Blood Urea Nitrogen - 19  Calcium - 9.2  Carbond Dioxide - 25  Chloride - 95  Creatinine - 0.7  Glucose - 154  Potassium - 4.7  Sodium - 135      Hemoglobin A1c -   PT/INR - ( 16 Jan 2019 07:07 )   PT: 12.10 sec;   INR: 1.05 ratio         PTT - ( 16 Jan 2019 07:07 )  PTT:29.8 sec  Urine Culture:        Imaging reviewed      MEDICATIONS  (STANDING):  ALBUTerol/ipratropium for Nebulization 3 milliLiter(s) Nebulizer every 6 hours  azithromycin  IVPB      azithromycin  IVPB 500 milliGRAM(s) IV Intermittent every 24 hours  cefTRIAXone   IVPB 1 Gram(s) IV Intermittent every 24 hours  cefTRIAXone   IVPB      chlorhexidine 2% Cloths 1 Application(s) Topical <User Schedule>  enoxaparin Injectable 140 milliGRAM(s) SubCutaneous two times a day  levothyroxine 25 MICROGram(s) Oral daily  methylPREDNISolone sodium succinate Injectable 60 milliGRAM(s) IV Push every 8 hours  metoprolol tartrate 25 milliGRAM(s) Oral every 6 hours  simvastatin 20 milliGRAM(s) Oral at bedtime    MEDICATIONS  (PRN):  acetaminophen   Tablet .. 650 milliGRAM(s) Oral every 6 hours PRN Temp greater or equal to 38C (100.4F)

## 2019-01-17 NOTE — PROGRESS NOTE ADULT - SUBJECTIVE AND OBJECTIVE BOX
CHIEF COMPLAINT:Patient is a 53y old  Female who presents with a chief complaint of shortness of breath and cough (16 Jan 2019 17:37)      HISTORY OF PRESENT ILLNESS:   HPI:  53 y.o. f w/ PMHx of HTN, CAD (s/p CABG), Hypothyroidism, DLD presents w/ a chief complaint of nonresolving dry nonproductive cough and shortness of breath on exertion associated w/ fevers. Patient had a temp of 101F at home. Patient went to go see PMD last week and completed the z-pack but still had no resolution of symptoms.     In ED- CXR showed cavitary lesions so a CT Chest was done. It showed findings are concerning for malignancy such as small cell carcinoma and a right hilar lymph node with likely necrotic center as described above correlates with chest x-ray findings. In addition, found to be in new-onset afib w/ rvr. (16 Jan 2019 00:19)    Have not seen patient in some time in office.  Had 3VCABG in 1996 in 9/14 with patent grafts. Presented with atrial fibrillation.     PAST MEDICAL & SURGICAL HISTORY:  Hypothyroid  High cholesterol  Heart disease: TRIPLE BYPASS  No significant past surgical history    FAMILY HISTORY:  Family history of lung cancer (Uncle)    Allergies    No Known Allergies    Intolerances    	  Home Medications:  AZITHROMYCIN 250 MG TABLET:  (15 Deandre 2019 23:19)  LEVOTHYROXINE 25 MCG TABLET:  (15 Deandre 2019 23:19)  PROMETHAZINE-CODEINE SYRUP:  (15 Deandre 2019 23:19)  SIMVASTATIN 20 MG TABLET:  (15 Deandre 2019 23:19)    MEDICATIONS  (STANDING):  ALBUTerol/ipratropium for Nebulization 3 milliLiter(s) Nebulizer every 6 hours  azithromycin  IVPB      azithromycin  IVPB 500 milliGRAM(s) IV Intermittent every 24 hours  cefTRIAXone   IVPB 1 Gram(s) IV Intermittent every 24 hours  cefTRIAXone   IVPB      chlorhexidine 2% Cloths 1 Application(s) Topical <User Schedule>  enoxaparin Injectable 140 milliGRAM(s) SubCutaneous two times a day  levothyroxine 25 MICROGram(s) Oral daily  methylPREDNISolone sodium succinate Injectable 60 milliGRAM(s) IV Push every 8 hours  metoprolol tartrate 25 milliGRAM(s) Oral every 6 hours  simvastatin 20 milliGRAM(s) Oral at bedtime    MEDICATIONS  (PRN):  acetaminophen   Tablet .. 650 milliGRAM(s) Oral every 6 hours PRN Temp greater or equal to 38C (100.4F)        SOCIAL HISTORY:    [ ] Non-smoker  [ ] Smoker  [ ] Alcohol      REVIEW OF SYSTEMS:    PHYSICAL EXAM:  T(C): 36.3 (01-16-19 @ 23:49), Max: 36.3 (01-16-19 @ 23:49)  HR: 77 (01-17-19 @ 08:04) (69 - 93)  BP: 125/74 (01-17-19 @ 08:04) (102/63 - 125/74)  RR: 18 (01-17-19 @ 08:04) (18 - 18)  SpO2: 99% (01-17-19 @ 08:04) (97% - 99%)  Wt(kg): --  I&O's Summary    Daily     Daily     General Appearance: Normal	  Cardiovascular: Normal S1 S2, No JVD, No murmurs, No edema  Respiratory: Lungs clear to auscultation	  Psychiatry: A & O x 3, Mood & affect appropriate  Gastrointestinal:  Soft, Non-tender  Skin: No rashes, No ecchymoses, No cyanosis	  Neurologic: Non-focal  Extremities: Normal range of motion, No clubbing, cyanosis or edema  Vascular: Peripheral pulses palpable 2+ bilaterally        LABS:	 	                        14.1   3.88  )-----------( 328      ( 17 Jan 2019 06:10 )             45.3     01-17    135  |  95<L>  |  19  ----------------------------<  154<H>  4.7   |  25  |  0.7  01-16    136  |  95<L>  |  11  ----------------------------<  213<H>  4.6   |  23  |  0.7    Ca    9.2      17 Jan 2019 06:10  Ca    9.3      16 Jan 2019 07:07  Mg     2.3     01-16  Mg     1.7     01-15    TPro  8.1<H>  /  Alb  4.0  /  TBili  0.3  /  DBili  x   /  AST  25  /  ALT  17  /  AlkPhos  64  01-16  TPro  7.6  /  Alb  3.8  /  TBili  0.5  /  DBili  x   /  AST  25  /  ALT  15  /  AlkPhos  63  01-15      proBNP:   Lipid Profile:   HgA1c:   TSH:       CARDIAC MARKERS:            TELEMETRY EVENTS: 	    ECG:  	  RADIOLOGY:  	    PREVIOUS DIAGNOSTIC TESTING:    [ ] Echocardiogram:  [ ]  Catheterization:  [ ] Stress Test:

## 2019-01-17 NOTE — DISCHARGE NOTE ADULT - PATIENT PORTAL LINK FT
You can access the [facility] Patient Portal, offered by [facility], by registering with the following website: [email or web address]

## 2019-01-17 NOTE — DISCHARGE NOTE ADULT - ADDITIONAL INSTRUCTIONS
Follow up with Dr. Abdullahi for bronchoscopy and biopsy. You will have to hold the blood thinner prior to the biopsy  Follow up with Dr. Becerra

## 2019-01-17 NOTE — PROGRESS NOTE ADULT - ASSESSMENT
Pt with hx of stable CAD wtih 3 VCABG in 1996 and cath iwth patent grafts in 9/14.     She has ? malignancy and is for biopsy and further evalaution.     AFib rate controlled continue BB for control.   We also noted that with ? malignancy is at risk we usually consider using coumadin in these instances in the setting of active malignancy.  The result of this will determine if we should use coumadin or a noag.     For now tihe AFib when clear from surgical and biopsy standpoint would start xarelto 20mg daily.

## 2019-01-17 NOTE — DISCHARGE NOTE ADULT - MEDICATION SUMMARY - MEDICATIONS TO STOP TAKING
I will STOP taking the medications listed below when I get home from the hospital:    AZITHROMYCIN 250 MG TABLET

## 2019-01-17 NOTE — DISCHARGE NOTE ADULT - HOSPITAL COURSE
53 y/f presented with cough, was found to have right cavitary lesion and multiple lymphadenopathy. Was also Flu +ve, had bilateral wheezing. Pulmonary recommended outpatient f/u for bronchoscopy and biopsy. SHe also had new onset a fib, was started on eliquis and metoprolol. Tamiflu was not given as she had these symptoms for .5 days. Will send her po steroid and ventolin for wheeze.  will f/u Dr. charles for outpatient bronchoscopy and EBUS

## 2019-01-17 NOTE — PROGRESS NOTE ADULT - ASSESSMENT
53 y/f presented with cough, was found to have right cavitary lesion and multiple lymphadenopathy. Was also Flu +ve, had bilateral wheezing. Pulmonary recommended outpatient f/u for bronchoscopy and biopsy. SHe also had new onset a fib, was started on eliquis and metoprolol. Tamiflu was not given as she had these symptoms for .5 days. Will send her po steroid and ventolin for wheeze.  will f/u Dr. charles for outpatient bronchoscopy and EBUS    Pt medically cleared for OR  Medical recc dw resident

## 2019-01-17 NOTE — DISCHARGE NOTE ADULT - PLAN OF CARE
Diagnosis with biopsy as outpatient You were found to have cavitary lesion in right lung. For definitive diagnosis you will need a bronchoscopy and biopsy to be done by pulmonologist. Please follow up with Dr. Abdullahi for this maintain normal rate or normal rhythm Your were started on blood thinner, and started on metoprolol for controlling your heart rate. Please take these medication and follow up with your heart doctor Resolution of symtpom You were positive for influenza A. Please take plenty of fluid. You also had some wheezing so take ventolin puffs as needed and steroid for 5 days

## 2019-01-21 LAB
CULTURE RESULTS: SIGNIFICANT CHANGE UP
SPECIMEN SOURCE: SIGNIFICANT CHANGE UP

## 2019-01-23 DIAGNOSIS — R06.02 SHORTNESS OF BREATH: ICD-10-CM

## 2019-01-23 DIAGNOSIS — E87.1 HYPO-OSMOLALITY AND HYPONATREMIA: ICD-10-CM

## 2019-01-23 DIAGNOSIS — E78.5 HYPERLIPIDEMIA, UNSPECIFIED: ICD-10-CM

## 2019-01-23 DIAGNOSIS — Z95.1 PRESENCE OF AORTOCORONARY BYPASS GRAFT: ICD-10-CM

## 2019-01-23 DIAGNOSIS — I10 ESSENTIAL (PRIMARY) HYPERTENSION: ICD-10-CM

## 2019-01-23 DIAGNOSIS — I48.91 UNSPECIFIED ATRIAL FIBRILLATION: ICD-10-CM

## 2019-01-23 DIAGNOSIS — G47.33 OBSTRUCTIVE SLEEP APNEA (ADULT) (PEDIATRIC): ICD-10-CM

## 2019-01-23 DIAGNOSIS — R00.0 TACHYCARDIA, UNSPECIFIED: ICD-10-CM

## 2019-01-23 DIAGNOSIS — E03.9 HYPOTHYROIDISM, UNSPECIFIED: ICD-10-CM

## 2019-01-23 DIAGNOSIS — Z87.891 PERSONAL HISTORY OF NICOTINE DEPENDENCE: ICD-10-CM

## 2019-01-23 DIAGNOSIS — R59.9 ENLARGED LYMPH NODES, UNSPECIFIED: ICD-10-CM

## 2019-01-23 DIAGNOSIS — R91.8 OTHER NONSPECIFIC ABNORMAL FINDING OF LUNG FIELD: ICD-10-CM

## 2019-01-23 DIAGNOSIS — J10.1 INFLUENZA DUE TO OTHER IDENTIFIED INFLUENZA VIRUS WITH OTHER RESPIRATORY MANIFESTATIONS: ICD-10-CM

## 2019-01-23 DIAGNOSIS — I25.10 ATHEROSCLEROTIC HEART DISEASE OF NATIVE CORONARY ARTERY WITHOUT ANGINA PECTORIS: ICD-10-CM

## 2019-01-23 DIAGNOSIS — J45.909 UNSPECIFIED ASTHMA, UNCOMPLICATED: ICD-10-CM

## 2019-01-23 DIAGNOSIS — Z79.2 LONG TERM (CURRENT) USE OF ANTIBIOTICS: ICD-10-CM

## 2019-02-21 PROBLEM — E03.9 HYPOTHYROIDISM, UNSPECIFIED: Chronic | Status: ACTIVE | Noted: 2019-01-15

## 2019-02-21 PROBLEM — E78.00 PURE HYPERCHOLESTEROLEMIA, UNSPECIFIED: Chronic | Status: ACTIVE | Noted: 2019-01-15

## 2019-02-21 PROBLEM — I51.9 HEART DISEASE, UNSPECIFIED: Chronic | Status: ACTIVE | Noted: 2019-01-15

## 2019-02-22 ENCOUNTER — APPOINTMENT (OUTPATIENT)
Dept: UROLOGY | Facility: CLINIC | Age: 54
End: 2019-02-22

## 2019-02-22 ENCOUNTER — APPOINTMENT (OUTPATIENT)
Dept: UROLOGY | Facility: CLINIC | Age: 54
End: 2019-02-22
Payer: MEDICAID

## 2019-02-22 VITALS
SYSTOLIC BLOOD PRESSURE: 154 MMHG | WEIGHT: 290 LBS | BODY MASS INDEX: 46.61 KG/M2 | HEIGHT: 66 IN | HEART RATE: 54 BPM | DIASTOLIC BLOOD PRESSURE: 88 MMHG

## 2019-02-22 DIAGNOSIS — Z82.49 FAMILY HISTORY OF ISCHEMIC HEART DISEASE AND OTHER DISEASES OF THE CIRCULATORY SYSTEM: ICD-10-CM

## 2019-02-22 DIAGNOSIS — Z78.9 OTHER SPECIFIED HEALTH STATUS: ICD-10-CM

## 2019-02-22 DIAGNOSIS — I48.91 UNSPECIFIED ATRIAL FIBRILLATION: ICD-10-CM

## 2019-02-22 DIAGNOSIS — E78.5 HYPERLIPIDEMIA, UNSPECIFIED: ICD-10-CM

## 2019-02-22 PROBLEM — Z00.00 ENCOUNTER FOR PREVENTIVE HEALTH EXAMINATION: Status: ACTIVE | Noted: 2019-02-22

## 2019-02-22 PROCEDURE — 99205 OFFICE O/P NEW HI 60 MIN: CPT

## 2019-02-22 NOTE — ASSESSMENT
[FreeTextEntry1] : RENZO ORELLANA is a 53 year old female with a history of CAD/CABG in her 20s, recently dx afib on eliquis, morbid obesity who presents with a newly diagnosed 7.1 cm right renal mass detected upon work up during for bout of influenza. \par Plan for right robotic radical nephrectomy with possible retroperitoneal lymph node excision if suspicious lymph nodes are encountered. Robotic approach recommended versus laparoscopic in this case as the mass is abutting the hilum any may need to perform retroperitoneal/hilar lymph node excision if suspicious nodes are encountered. This is a cT2a renal hilar mass and therefore I do not recommend partial nephrectomy given the surgical and oncologic risks. Patient and accompanying family members agree. Will quantify renal function with a MAG 3 renal scan.\par CXR \par Cardiac clearance re eliquis and given signif hx of CAD\par \par Renal mass discussion:\par The dx of renal mass was discussed in great detail.\par We discussed the fact that enhancement within a renal mass suggests malignancy, but that a benign renal tumor cannot be excluded.\par Overall the risks of CA appears to be about 80%.\par We discussed the problems with renal bx, the limited indications, and the need to treat based primarily on radiographic findings.  The patient appears to understand that there is about a 20% chance that this mass may be benign. We discussed the pros and cons of renal mass biopsy.\par We also discussed that active surveillance of renal masses (typically <3 cm) is a perfectly reasonable option as stated in the AUA guidelines. \par \par Regarding treatment, we discussed radical nephrectomy vs. partial nephrectomy.\par With respect to P Nx we discussed a potential advantage with renal function long term.  We discussed the risk risk of urinoma, bleeding, infection, possible need for reoperation, local recurrence.  We also discussed the potential need for Rad Nx dependent on intraoperative findings.  For both R Nx and P Nx, we discussed risk of any major surgery, including but not limited to MI, CVA, DVT, infection, blood transfusion, wound healing problems, injury to surrounding structures (i.e. including but not limited to bowel or other adjacent organs), positioning injuries and even death.  All of these issues were reviewed.  We discussed risk of cancer recurrence and potential need for additional therapy.  We also discussed risk of renal insufficiency and dialysis short and long-term with R Nx.  For partial nephrectomy, we discussed possible need to convert to R Nx dependent on intraoperative findings and anatomy.\par \par We also discussed open vs. laparoscopic/robotic surgery and advantages and disadvantages each way. For laparoscopic/robotic surgery, we discussed possibility that conversion to open surgery might be required dependent on intraoperative findings and anatomy.  \par \par We also discussed renal ablative therapies such as cryo Rx and RFA.  We reviewed the data for these modalities and the overall encouraging findings thus far, but also discussed the fact that long-term cancer control issues remain unproven due to the still somewhat novel status of these modalities.\par In summary, we discussed the procedure, risks, potential benefits, and reasonable alternatives. All questions were answered.  \par I asked this patient to call if any additional questions or concerns.\par

## 2019-02-22 NOTE — LETTER HEADER
[FreeTextEntry3] : Alejandro Tipton MD\par Robotic Urologic and Minimally Invasive Surgery\par \par [facility]\par Division of Urology\par [address]\par [address]\[place] [address]\par Tel [phone]\par Fax [phone]\par \par

## 2019-02-22 NOTE — LETTER BODY
[Dear  ___] : Dear  [unfilled], [Consult Letter:] : I had the pleasure of evaluating your patient, [unfilled]. [Please see my note below.] : Please see my note below. [Consult Closing:] : Thank you very much for allowing me to participate in the care of this patient.  If you have any questions, please do not hesitate to contact me. [Sincerely,] : Sincerely, [FreeTextEntry3] : Alejandro Tipton MD\par Robotic Urologic and Minimally Invasive Surgery\par [facility]\par Division of Urology\par  no depression/no anxiety

## 2019-02-22 NOTE — HISTORY OF PRESENT ILLNESS
[FreeTextEntry1] : RENZO ORELLANA is a 53 year old female with a history of CAD/CABG in her 20s, recently dx afib on eliquis, morbid obesity who presents with a newly diagnosed 7.1 cm right renal mass detected upon work up during for bout of influenza. MRI images reviewed which showed a 7.1 cm right lower pole anterior and lower pole enhancing renal mass with central necrosis, abutting the main renal vein/hilum without renal vein invasion. Nephrometry score is 8ah. No evidence for metastatic disease in the abdomen and no reported evidence for metastatic disease on the recent PET CT from 2/8/2019.\par \par When pt was first dx w influeneza, CXR revelaed a hilar cavitary mass and chest CT showed --1.  Extensive mediastinal, bilateral hilar, and right supraclavicular \par lymphadenopathy \par She had a follow up PET scan approx 3 weeks later which was neg for any metastatic disease in the C/A/P.\par \par GFR 99, Cr 0.7\par \par Denies gross hematuria, dysuria or associated symptoms.  No flank pain.\par Denies  PMH including kidney stones, recurrent UTIs. \par Family History: Uncle w RCC\par No prior abdominal surgeries.\par Case also discussed w my partner Dr Mckeon who originally received the consult. \par

## 2019-02-25 ENCOUNTER — OTHER (OUTPATIENT)
Age: 54
End: 2019-02-25

## 2019-03-01 ENCOUNTER — OUTPATIENT (OUTPATIENT)
Dept: OUTPATIENT SERVICES | Facility: HOSPITAL | Age: 54
LOS: 1 days | End: 2019-03-01

## 2019-03-05 ENCOUNTER — FORM ENCOUNTER (OUTPATIENT)
Age: 54
End: 2019-03-05

## 2019-03-06 ENCOUNTER — OUTPATIENT (OUTPATIENT)
Dept: OUTPATIENT SERVICES | Facility: HOSPITAL | Age: 54
LOS: 1 days | Discharge: HOME | End: 2019-03-06

## 2019-03-06 DIAGNOSIS — N28.89 OTHER SPECIFIED DISORDERS OF KIDNEY AND URETER: ICD-10-CM

## 2019-03-06 DIAGNOSIS — R06.9 UNSPECIFIED ABNORMALITIES OF BREATHING: ICD-10-CM

## 2019-03-14 ENCOUNTER — OUTPATIENT (OUTPATIENT)
Dept: OUTPATIENT SERVICES | Facility: HOSPITAL | Age: 54
LOS: 1 days | Discharge: HOME | End: 2019-03-14

## 2019-03-14 VITALS
WEIGHT: 293 LBS | HEART RATE: 86 BPM | OXYGEN SATURATION: 97 % | SYSTOLIC BLOOD PRESSURE: 140 MMHG | DIASTOLIC BLOOD PRESSURE: 68 MMHG | HEIGHT: 66 IN | TEMPERATURE: 97 F | RESPIRATION RATE: 16 BRPM

## 2019-03-14 DIAGNOSIS — N28.89 OTHER SPECIFIED DISORDERS OF KIDNEY AND URETER: ICD-10-CM

## 2019-03-14 DIAGNOSIS — Z01.818 ENCOUNTER FOR OTHER PREPROCEDURAL EXAMINATION: ICD-10-CM

## 2019-03-14 DIAGNOSIS — I25.810 ATHEROSCLEROSIS OF CORONARY ARTERY BYPASS GRAFT(S) WITHOUT ANGINA PECTORIS: Chronic | ICD-10-CM

## 2019-03-14 LAB
ALBUMIN SERPL ELPH-MCNC: 4.2 G/DL — SIGNIFICANT CHANGE UP (ref 3.5–5.2)
ALP SERPL-CCNC: 78 U/L — SIGNIFICANT CHANGE UP (ref 30–115)
ALT FLD-CCNC: 14 U/L — SIGNIFICANT CHANGE UP (ref 0–41)
ANION GAP SERPL CALC-SCNC: 15 MMOL/L — HIGH (ref 7–14)
APPEARANCE UR: CLEAR — SIGNIFICANT CHANGE UP
APTT BLD: 33.4 SEC — SIGNIFICANT CHANGE UP (ref 27–39.2)
AST SERPL-CCNC: 15 U/L — SIGNIFICANT CHANGE UP (ref 0–41)
BASOPHILS # BLD AUTO: 0.04 K/UL — SIGNIFICANT CHANGE UP (ref 0–0.2)
BASOPHILS NFR BLD AUTO: 0.6 % — SIGNIFICANT CHANGE UP (ref 0–1)
BILIRUB SERPL-MCNC: 0.7 MG/DL — SIGNIFICANT CHANGE UP (ref 0.2–1.2)
BILIRUB UR-MCNC: NEGATIVE — SIGNIFICANT CHANGE UP
BLD GP AB SCN SERPL QL: SIGNIFICANT CHANGE UP
BUN SERPL-MCNC: 13 MG/DL — SIGNIFICANT CHANGE UP (ref 10–20)
CALCIUM SERPL-MCNC: 9.4 MG/DL — SIGNIFICANT CHANGE UP (ref 8.5–10.1)
CHLORIDE SERPL-SCNC: 103 MMOL/L — SIGNIFICANT CHANGE UP (ref 98–110)
CO2 SERPL-SCNC: 24 MMOL/L — SIGNIFICANT CHANGE UP (ref 17–32)
COLOR SPEC: YELLOW — SIGNIFICANT CHANGE UP
CREAT SERPL-MCNC: 0.6 MG/DL — LOW (ref 0.7–1.5)
DIFF PNL FLD: NEGATIVE — SIGNIFICANT CHANGE UP
EOSINOPHIL # BLD AUTO: 0.13 K/UL — SIGNIFICANT CHANGE UP (ref 0–0.7)
EOSINOPHIL NFR BLD AUTO: 2 % — SIGNIFICANT CHANGE UP (ref 0–8)
GLUCOSE SERPL-MCNC: 104 MG/DL — HIGH (ref 70–99)
GLUCOSE UR QL: NEGATIVE MG/DL — SIGNIFICANT CHANGE UP
HCG SERPL-ACNC: 1.3 MIU/ML — SIGNIFICANT CHANGE UP
HCT VFR BLD CALC: 43.3 % — SIGNIFICANT CHANGE UP (ref 37–47)
HGB BLD-MCNC: 13.7 G/DL — SIGNIFICANT CHANGE UP (ref 12–16)
IMM GRANULOCYTES NFR BLD AUTO: 0.3 % — SIGNIFICANT CHANGE UP (ref 0.1–0.3)
INR BLD: 1.25 RATIO — SIGNIFICANT CHANGE UP (ref 0.65–1.3)
KETONES UR-MCNC: NEGATIVE — SIGNIFICANT CHANGE UP
LEUKOCYTE ESTERASE UR-ACNC: NEGATIVE — SIGNIFICANT CHANGE UP
LYMPHOCYTES # BLD AUTO: 1.99 K/UL — SIGNIFICANT CHANGE UP (ref 1.2–3.4)
LYMPHOCYTES # BLD AUTO: 30.6 % — SIGNIFICANT CHANGE UP (ref 20.5–51.1)
MCHC RBC-ENTMCNC: 25.5 PG — LOW (ref 27–31)
MCHC RBC-ENTMCNC: 31.6 G/DL — LOW (ref 32–37)
MCV RBC AUTO: 80.6 FL — LOW (ref 81–99)
MONOCYTES # BLD AUTO: 0.36 K/UL — SIGNIFICANT CHANGE UP (ref 0.1–0.6)
MONOCYTES NFR BLD AUTO: 5.5 % — SIGNIFICANT CHANGE UP (ref 1.7–9.3)
NEUTROPHILS # BLD AUTO: 3.97 K/UL — SIGNIFICANT CHANGE UP (ref 1.4–6.5)
NEUTROPHILS NFR BLD AUTO: 61 % — SIGNIFICANT CHANGE UP (ref 42.2–75.2)
NITRITE UR-MCNC: NEGATIVE — SIGNIFICANT CHANGE UP
NRBC # BLD: 0 /100 WBCS — SIGNIFICANT CHANGE UP (ref 0–0)
PH UR: 5.5 — SIGNIFICANT CHANGE UP (ref 5–8)
PLATELET # BLD AUTO: 319 K/UL — SIGNIFICANT CHANGE UP (ref 130–400)
POTASSIUM SERPL-MCNC: 4.6 MMOL/L — SIGNIFICANT CHANGE UP (ref 3.5–5)
POTASSIUM SERPL-SCNC: 4.6 MMOL/L — SIGNIFICANT CHANGE UP (ref 3.5–5)
PROT SERPL-MCNC: 7.3 G/DL — SIGNIFICANT CHANGE UP (ref 6–8)
PROT UR-MCNC: NEGATIVE MG/DL — SIGNIFICANT CHANGE UP
PROTHROM AB SERPL-ACNC: 14.4 SEC — HIGH (ref 9.95–12.87)
RBC # BLD: 5.37 M/UL — SIGNIFICANT CHANGE UP (ref 4.2–5.4)
RBC # FLD: 16.1 % — HIGH (ref 11.5–14.5)
SODIUM SERPL-SCNC: 142 MMOL/L — SIGNIFICANT CHANGE UP (ref 135–146)
SP GR SPEC: 1.02 — SIGNIFICANT CHANGE UP (ref 1.01–1.03)
TYPE + AB SCN PNL BLD: SIGNIFICANT CHANGE UP
UROBILINOGEN FLD QL: 0.2 MG/DL — SIGNIFICANT CHANGE UP (ref 0.2–0.2)
WBC # BLD: 6.51 K/UL — SIGNIFICANT CHANGE UP (ref 4.8–10.8)
WBC # FLD AUTO: 6.51 K/UL — SIGNIFICANT CHANGE UP (ref 4.8–10.8)

## 2019-03-14 NOTE — H&P PST ADULT - HISTORY OF PRESENT ILLNESS
53yr old female states was diagnosed RIGHT RENAL CANCER -PRESENTS FOR RADICAL ROBOTIC  RIGHT NEPHRECTOMY. Denies c/o CP, PALP, SOB, URI, FEVER, RASH OR UTI  SYMPTOMS.

## 2019-03-14 NOTE — H&P PST ADULT - NSICDXFAMILYHX_GEN_ALL_CORE_FT
FAMILY HISTORY:  Uncle  Still living? Unknown  Family history of lung cancer, Age at diagnosis: Age Unknown

## 2019-03-14 NOTE — H&P PST ADULT - NSANTHOSAYNRD_GEN_A_CORE
No. ELVIN screening performed.  STOP BANG Legend: 0-2 = LOW Risk; 3-4 = INTERMEDIATE Risk; 5-8 = HIGH Risk

## 2019-03-14 NOTE — H&P PST ADULT - NSICDXPASTMEDICALHX_GEN_ALL_CORE_FT
PAST MEDICAL HISTORY:  Afib since jan 2019    CAD (coronary artery disease) of bypass graft at age 25    Heart disease TRIPLE BYPASS    High cholesterol     Hypothyroid     Obesity

## 2019-03-15 LAB
CULTURE RESULTS: SIGNIFICANT CHANGE UP
SPECIMEN SOURCE: SIGNIFICANT CHANGE UP

## 2019-03-27 NOTE — ASU PATIENT PROFILE, ADULT - TEACHING/LEARNING DEVELOPMENTAL CONSIDERATIONS
Writer left message for Marizol to call back when she has a chance. We need to know what can be done on our end to get the patient a nebulizer.   none

## 2019-03-28 ENCOUNTER — APPOINTMENT (OUTPATIENT)
Dept: UROLOGY | Facility: HOSPITAL | Age: 54
End: 2019-03-28
Payer: MEDICAID

## 2019-03-28 ENCOUNTER — RESULT REVIEW (OUTPATIENT)
Age: 54
End: 2019-03-28

## 2019-03-28 ENCOUNTER — INPATIENT (INPATIENT)
Facility: HOSPITAL | Age: 54
LOS: 0 days | Discharge: HOME | End: 2019-03-29
Attending: UROLOGY | Admitting: UROLOGY
Payer: MEDICAID

## 2019-03-28 VITALS
SYSTOLIC BLOOD PRESSURE: 134 MMHG | DIASTOLIC BLOOD PRESSURE: 96 MMHG | HEART RATE: 50 BPM | RESPIRATION RATE: 18 BRPM | TEMPERATURE: 98 F | WEIGHT: 289.91 LBS | HEIGHT: 66 IN

## 2019-03-28 DIAGNOSIS — I25.810 ATHEROSCLEROSIS OF CORONARY ARTERY BYPASS GRAFT(S) WITHOUT ANGINA PECTORIS: Chronic | ICD-10-CM

## 2019-03-28 LAB
ABO RH CONFIRMATION: SIGNIFICANT CHANGE UP
ANION GAP SERPL CALC-SCNC: 16 MMOL/L — HIGH (ref 7–14)
BUN SERPL-MCNC: 16 MG/DL — SIGNIFICANT CHANGE UP (ref 10–20)
CALCIUM SERPL-MCNC: 9 MG/DL — SIGNIFICANT CHANGE UP (ref 8.5–10.1)
CHLORIDE SERPL-SCNC: 101 MMOL/L — SIGNIFICANT CHANGE UP (ref 98–110)
CO2 SERPL-SCNC: 21 MMOL/L — SIGNIFICANT CHANGE UP (ref 17–32)
CREAT SERPL-MCNC: 0.8 MG/DL — SIGNIFICANT CHANGE UP (ref 0.7–1.5)
GLUCOSE SERPL-MCNC: 156 MG/DL — HIGH (ref 70–99)
HCT VFR BLD CALC: 43.9 % — SIGNIFICANT CHANGE UP (ref 37–47)
HGB BLD-MCNC: 13.7 G/DL — SIGNIFICANT CHANGE UP (ref 12–16)
MCHC RBC-ENTMCNC: 25.4 PG — LOW (ref 27–31)
MCHC RBC-ENTMCNC: 31.2 G/DL — LOW (ref 32–37)
MCV RBC AUTO: 81.4 FL — SIGNIFICANT CHANGE UP (ref 81–99)
NRBC # BLD: 0 /100 WBCS — SIGNIFICANT CHANGE UP (ref 0–0)
PLATELET # BLD AUTO: 336 K/UL — SIGNIFICANT CHANGE UP (ref 130–400)
POTASSIUM SERPL-MCNC: 4.3 MMOL/L — SIGNIFICANT CHANGE UP (ref 3.5–5)
POTASSIUM SERPL-SCNC: 4.3 MMOL/L — SIGNIFICANT CHANGE UP (ref 3.5–5)
RBC # BLD: 5.39 M/UL — SIGNIFICANT CHANGE UP (ref 4.2–5.4)
RBC # FLD: 15.7 % — HIGH (ref 11.5–14.5)
SODIUM SERPL-SCNC: 138 MMOL/L — SIGNIFICANT CHANGE UP (ref 135–146)
WBC # BLD: 16.39 K/UL — HIGH (ref 4.8–10.8)
WBC # FLD AUTO: 16.39 K/UL — HIGH (ref 4.8–10.8)

## 2019-03-28 PROCEDURE — 88307 TISSUE EXAM BY PATHOLOGIST: CPT | Mod: 26

## 2019-03-28 PROCEDURE — 50545 LAPARO RADICAL NEPHRECTOMY: CPT | Mod: RT

## 2019-03-28 PROCEDURE — S2900 ROBOTIC SURGICAL SYSTEM: CPT | Mod: NC

## 2019-03-28 PROCEDURE — 43840 GSTRRPHY SUTR DUOL/GSTR ULCR: CPT

## 2019-03-28 RX ORDER — DEXAMETHASONE 0.5 MG/5ML
4 ELIXIR ORAL ONCE
Qty: 0 | Refills: 0 | Status: DISCONTINUED | OUTPATIENT
Start: 2019-03-28 | End: 2019-03-29

## 2019-03-28 RX ORDER — CEFAZOLIN SODIUM 1 G
2000 VIAL (EA) INJECTION EVERY 8 HOURS
Qty: 0 | Refills: 0 | Status: COMPLETED | OUTPATIENT
Start: 2019-03-28 | End: 2019-03-29

## 2019-03-28 RX ORDER — MEPERIDINE HYDROCHLORIDE 50 MG/ML
12.5 INJECTION INTRAMUSCULAR; INTRAVENOUS; SUBCUTANEOUS ONCE
Qty: 0 | Refills: 0 | Status: DISCONTINUED | OUTPATIENT
Start: 2019-03-28 | End: 2019-03-28

## 2019-03-28 RX ORDER — METOPROLOL TARTRATE 50 MG
25 TABLET ORAL
Qty: 0 | Refills: 0 | Status: DISCONTINUED | OUTPATIENT
Start: 2019-03-28 | End: 2019-03-29

## 2019-03-28 RX ORDER — OXYCODONE HYDROCHLORIDE 5 MG/1
5 TABLET ORAL EVERY 6 HOURS
Qty: 0 | Refills: 0 | Status: DISCONTINUED | OUTPATIENT
Start: 2019-03-28 | End: 2019-03-29

## 2019-03-28 RX ORDER — SODIUM CHLORIDE 9 MG/ML
1000 INJECTION, SOLUTION INTRAVENOUS
Qty: 0 | Refills: 0 | Status: DISCONTINUED | OUTPATIENT
Start: 2019-03-28 | End: 2019-03-28

## 2019-03-28 RX ORDER — OXYCODONE HYDROCHLORIDE 5 MG/1
5 TABLET ORAL ONCE
Qty: 0 | Refills: 0 | Status: DISCONTINUED | OUTPATIENT
Start: 2019-03-28 | End: 2019-03-28

## 2019-03-28 RX ORDER — HYDROMORPHONE HYDROCHLORIDE 2 MG/ML
0.5 INJECTION INTRAMUSCULAR; INTRAVENOUS; SUBCUTANEOUS
Qty: 0 | Refills: 0 | Status: DISCONTINUED | OUTPATIENT
Start: 2019-03-28 | End: 2019-03-28

## 2019-03-28 RX ORDER — SENNA PLUS 8.6 MG/1
2 TABLET ORAL AT BEDTIME
Qty: 0 | Refills: 0 | Status: DISCONTINUED | OUTPATIENT
Start: 2019-03-28 | End: 2019-03-29

## 2019-03-28 RX ORDER — ACETAMINOPHEN 500 MG
1000 TABLET ORAL EVERY 6 HOURS
Qty: 0 | Refills: 0 | Status: DISCONTINUED | OUTPATIENT
Start: 2019-03-28 | End: 2019-03-29

## 2019-03-28 RX ORDER — HEPARIN SODIUM 5000 [USP'U]/ML
5000 INJECTION INTRAVENOUS; SUBCUTANEOUS EVERY 8 HOURS
Qty: 0 | Refills: 0 | Status: DISCONTINUED | OUTPATIENT
Start: 2019-03-28 | End: 2019-03-29

## 2019-03-28 RX ORDER — SIMVASTATIN 20 MG/1
20 TABLET, FILM COATED ORAL DAILY
Qty: 0 | Refills: 0 | Status: DISCONTINUED | OUTPATIENT
Start: 2019-03-28 | End: 2019-03-29

## 2019-03-28 RX ORDER — KETOROLAC TROMETHAMINE 30 MG/ML
15 SYRINGE (ML) INJECTION EVERY 6 HOURS
Qty: 0 | Refills: 0 | Status: DISCONTINUED | OUTPATIENT
Start: 2019-03-28 | End: 2019-03-29

## 2019-03-28 RX ORDER — MORPHINE SULFATE 50 MG/1
2 CAPSULE, EXTENDED RELEASE ORAL EVERY 6 HOURS
Qty: 0 | Refills: 0 | Status: DISCONTINUED | OUTPATIENT
Start: 2019-03-28 | End: 2019-03-29

## 2019-03-28 RX ORDER — MORPHINE SULFATE 50 MG/1
4 CAPSULE, EXTENDED RELEASE ORAL
Qty: 0 | Refills: 0 | Status: DISCONTINUED | OUTPATIENT
Start: 2019-03-28 | End: 2019-03-28

## 2019-03-28 RX ORDER — LEVOTHYROXINE SODIUM 125 MCG
25 TABLET ORAL DAILY
Qty: 0 | Refills: 0 | Status: DISCONTINUED | OUTPATIENT
Start: 2019-03-28 | End: 2019-03-29

## 2019-03-28 RX ORDER — DOCUSATE SODIUM 100 MG
100 CAPSULE ORAL THREE TIMES A DAY
Qty: 0 | Refills: 0 | Status: DISCONTINUED | OUTPATIENT
Start: 2019-03-28 | End: 2019-03-29

## 2019-03-28 RX ORDER — ONDANSETRON 8 MG/1
4 TABLET, FILM COATED ORAL ONCE
Qty: 0 | Refills: 0 | Status: DISCONTINUED | OUTPATIENT
Start: 2019-03-28 | End: 2019-03-28

## 2019-03-28 RX ORDER — SODIUM CHLORIDE 9 MG/ML
1000 INJECTION INTRAMUSCULAR; INTRAVENOUS; SUBCUTANEOUS
Qty: 0 | Refills: 0 | Status: DISCONTINUED | OUTPATIENT
Start: 2019-03-28 | End: 2019-03-29

## 2019-03-28 RX ADMIN — Medication 1000 MILLIGRAM(S): at 23:07

## 2019-03-28 RX ADMIN — HYDROMORPHONE HYDROCHLORIDE 0.5 MILLIGRAM(S): 2 INJECTION INTRAMUSCULAR; INTRAVENOUS; SUBCUTANEOUS at 15:26

## 2019-03-28 RX ADMIN — Medication 1000 MILLIGRAM(S): at 19:36

## 2019-03-28 RX ADMIN — Medication 100 MILLIGRAM(S): at 21:17

## 2019-03-28 RX ADMIN — SODIUM CHLORIDE 150 MILLILITER(S): 9 INJECTION INTRAMUSCULAR; INTRAVENOUS; SUBCUTANEOUS at 17:00

## 2019-03-28 RX ADMIN — HYDROMORPHONE HYDROCHLORIDE 0.5 MILLIGRAM(S): 2 INJECTION INTRAMUSCULAR; INTRAVENOUS; SUBCUTANEOUS at 16:03

## 2019-03-28 RX ADMIN — Medication 100 MILLIGRAM(S): at 21:16

## 2019-03-28 RX ADMIN — Medication 25 MILLIGRAM(S): at 19:01

## 2019-03-28 RX ADMIN — SODIUM CHLORIDE 125 MILLILITER(S): 9 INJECTION, SOLUTION INTRAVENOUS at 15:01

## 2019-03-28 RX ADMIN — Medication 15 MILLIGRAM(S): at 23:08

## 2019-03-28 RX ADMIN — HYDROMORPHONE HYDROCHLORIDE 0.5 MILLIGRAM(S): 2 INJECTION INTRAMUSCULAR; INTRAVENOUS; SUBCUTANEOUS at 15:20

## 2019-03-28 RX ADMIN — HYDROMORPHONE HYDROCHLORIDE 0.5 MILLIGRAM(S): 2 INJECTION INTRAMUSCULAR; INTRAVENOUS; SUBCUTANEOUS at 16:20

## 2019-03-28 RX ADMIN — SENNA PLUS 2 TABLET(S): 8.6 TABLET ORAL at 21:17

## 2019-03-28 RX ADMIN — Medication 15 MILLIGRAM(S): at 19:01

## 2019-03-28 RX ADMIN — HYDROMORPHONE HYDROCHLORIDE 0.5 MILLIGRAM(S): 2 INJECTION INTRAMUSCULAR; INTRAVENOUS; SUBCUTANEOUS at 15:50

## 2019-03-28 RX ADMIN — HEPARIN SODIUM 5000 UNIT(S): 5000 INJECTION INTRAVENOUS; SUBCUTANEOUS at 21:17

## 2019-03-28 RX ADMIN — HYDROMORPHONE HYDROCHLORIDE 0.5 MILLIGRAM(S): 2 INJECTION INTRAMUSCULAR; INTRAVENOUS; SUBCUTANEOUS at 15:08

## 2019-03-28 RX ADMIN — SIMVASTATIN 20 MILLIGRAM(S): 20 TABLET, FILM COATED ORAL at 23:10

## 2019-03-28 NOTE — CHART NOTE - NSCHARTNOTEFT_GEN_A_CORE
Anesthesia Post Op Assessment  		(    ) Intubated           TV _____	Rate __sv___	FiO2__4LNC___  		(  x  ) Patent airway. Full return of protective reflexes  		(   x )Full recovery from anesthesia/sedation to baseline status      Cardiovascular Function:  		BP:	133/71	                  Pulse:		70 afib                  RR:		12                  Temp:		97.5                  O2Sat:         96      Mental Status:  	        ( x   ) awake		  (  x  ) alert		 (    ) drowsy	               (    ) sedated      Nausea/Vomiting:  		(    ) Yes, See post-op orders		   (   x ) No      Pain Scale: (0-10):			Treatment:     (    ) None	            (  x  ) See Post-Op/PCA Orders      Post-operative Fluids: 	   (    ) Oral	          (  x  ) See post-op Orders        Comments:    Uneventful. No complications from anesthesia.  Discharge when criteria met.

## 2019-03-28 NOTE — PATIENT PROFILE ADULT - BRADEN ACTIVITY
4/16/2018         RE: Minerva Andrade  [address]     [facility] [address]        Dear Colleague,    Thank you for referring your patient, Minerva Andrade, to the [facility]. Please see a copy of my visit note below.    Chief Complaint: RUQ and epigastric pain    History of Present Illness:   40 year old woman sent in consultation by Angelica Santos NP for evaluation of RUQ and epigastric pain. This occurs about once a week, a dull ache which does not radiate. She notes that it is instigated by greasy food intake and lasts for about 2-3 hours, sometimes associated with nausea and vomiting. It is sometimes alleviated with antacid intake, but not reliably. The pain was severe enough once to necessitate an ED visit last summer, at which time, she was given some pain medication which alleviated her symptoms at that time. Since then, she continues to have symptoms as noted above. She denies any dark urine or acholic stool.       Past Medical History:   Diagnosis Date     NO ACTIVE PROBLEMS (aka NONE)      Past Surgical History:   Procedure Laterality Date     DILATION AND CURETTAGE SUCTION  2003/08/10    x 3     Current Outpatient Prescriptions   Medication     SUMAtriptan (IMITREX) 100 MG tablet     levonorgestrel (MIRENA) 20 MCG/24HR IUD     No current facility-administered medications for this visit.       No Known Allergies  Social History     Social History     Marital status: Single     Spouse name: Michael Andrade     Number of children: 0     Years of education: 16     Occupational History     Works currently in sales      Social History Main Topics     Smoking status: Never Smoker     Smokeless tobacco: Never Used     Alcohol use Yes      Comment: 1-11 a year     Drug use: No     Sexual activity: Yes     Partners: Male     Birth control/ protection: IUD      Comment: None     Other Topics Concern     Not on file     Social History Narrative     Family History   Problem Relation Age of  "Onset     DIABETES Mother      Hypertension Mother      Arthritis Maternal Grandmother      Arthritis Maternal Aunt            Review of Systems:  No chest pain, weight loss or night sweats. Fevers- undocumented Dyspnea with minimal exertion, 80 lbs weight gain over 1.5-2 years  All other systems questioned and negative.     Exam:  Vital signs for exam: BP (!) 136/95  Pulse 83  Ht 1.74 m (5' 8.5\")  Wt 120.7 kg (266 lb)  SpO2 96%  BMI 39.86 kg/m2  Constitutional: healthy, alert and no distress.  Head: Normocephalic. No masses, lesions, tenderness or abnormalities.  ENT: ENT exam normal, no neck nodes or sinus tenderness.  Cardiovascular: Normal S1, S2, no murmurs  Respiratory: Clear to auscultation.   Gastrointestinal:  Obese, abdomen soft, mild tenderness in RUQ. No masses, organomegaly.  : Deferred  Musculoskeletal: extremities normal- no gross deformities noted and normal muscle tone  Skin: no jaundice, rashes or petechiae.   Neurologic: Gait normal.  Psychiatric: Mentation appears normal and affect normal.      Radiology:   US: cholelithiasis, normal bile duct caliber      IMPRESSION AND PLAN:  Symptomatic cholelithiasis in an obese 40 year old woman. We discussed options and she would like to proceed with a laparoscopic cholecystectomy. Risks of the procedure explained to the patient, including bleeding, infection, visceral injury, bile duct injury, retained stone, and conversion to open. The patient accepts and is willing to proceed.       Again, thank you for allowing me to participate in the care of your patient.        Sincerely,        Alicia Medina MD    " (3) walks occasionally

## 2019-03-28 NOTE — BRIEF OPERATIVE NOTE - OPERATION/FINDINGS
renal mass successfully extirpated renal mass successfully extirpated, duodenal serosal reapproximation by Dr Willis

## 2019-03-28 NOTE — PROGRESS NOTE ADULT - ASSESSMENT
52 y/o female s/p R robotic radical nephrectomy POD #0, also had duodenal serosal reapproximation.  DVT prophylaxis/OOB  Incentive spirometry  Strict I&O's  Analgesia and antiemetics as needed  NPO tonight  AM labs  Ancef x3 doses

## 2019-03-28 NOTE — PROGRESS NOTE ADULT - SUBJECTIVE AND OBJECTIVE BOX
Post op Check  54 y/o female s/p R robotic radical nephrectomy POD #0, also had duodenal serosal reapproximation. Seen and examined at bedside, doing well without any complaints. Does have some mild aleida incisional tenderness.   Pt seen and examined without complaints. Pain is controlled. Denies SOB/CP/N/V.     Vital Signs Last 24 Hrs  T(C): 36.6 (28 Mar 2019 17:20), Max: 36.9 (28 Mar 2019 14:50)  T(F): 97.9 (28 Mar 2019 17:20), Max: 98.5 (28 Mar 2019 14:50)  HR: 75 (28 Mar 2019 17:20) (50 - 108)  BP: 106/65 (28 Mar 2019 17:20) (106/65 - 149/73)  BP(mean): --  RR: 21 (28 Mar 2019 17:20) (18 - 34)  SpO2: 96% (28 Mar 2019 17:20) (94% - 100%)    I&O's Summary    28 Mar 2019 07:01  -  28 Mar 2019 17:29  --------------------------------------------------------  IN: 250 mL / OUT: 215 mL / NET: 35 mL        Physical Exam  Gen: NAD, A&Ox3  Pulm: No respiratory distress, no subcostal retractions  CV: RRR, no JVD  Abd: Soft, NT, ND, + aleida incisional tenderness, incisions C/D/I, + MAXIMILIANO drain 25cc serosang fluid.  : + haywood cath draining clear yellow urine                          13.7   16.39 )-----------( 336      ( 28 Mar 2019 15:50 )             43.9       03-28    138  |  101  |  16  ----------------------------<  156<H>  4.3   |  21  |  0.8    Ca    9.0      28 Mar 2019 15:50

## 2019-03-29 ENCOUNTER — TRANSCRIPTION ENCOUNTER (OUTPATIENT)
Age: 54
End: 2019-03-29

## 2019-03-29 VITALS
TEMPERATURE: 96 F | HEART RATE: 86 BPM | SYSTOLIC BLOOD PRESSURE: 116 MMHG | DIASTOLIC BLOOD PRESSURE: 69 MMHG | RESPIRATION RATE: 18 BRPM

## 2019-03-29 DIAGNOSIS — Z71.89 OTHER SPECIFIED COUNSELING: ICD-10-CM

## 2019-03-29 LAB
ANION GAP SERPL CALC-SCNC: 17 MMOL/L — HIGH (ref 7–14)
BASOPHILS # BLD AUTO: 0.01 K/UL — SIGNIFICANT CHANGE UP (ref 0–0.2)
BASOPHILS NFR BLD AUTO: 0.1 % — SIGNIFICANT CHANGE UP (ref 0–1)
BUN SERPL-MCNC: 18 MG/DL — SIGNIFICANT CHANGE UP (ref 10–20)
CALCIUM SERPL-MCNC: 8.8 MG/DL — SIGNIFICANT CHANGE UP (ref 8.5–10.1)
CHLORIDE SERPL-SCNC: 101 MMOL/L — SIGNIFICANT CHANGE UP (ref 98–110)
CO2 SERPL-SCNC: 21 MMOL/L — SIGNIFICANT CHANGE UP (ref 17–32)
CREAT SERPL-MCNC: 1.2 MG/DL — SIGNIFICANT CHANGE UP (ref 0.7–1.5)
EOSINOPHIL # BLD AUTO: 0 K/UL — SIGNIFICANT CHANGE UP (ref 0–0.7)
EOSINOPHIL NFR BLD AUTO: 0 % — SIGNIFICANT CHANGE UP (ref 0–8)
GLUCOSE SERPL-MCNC: 128 MG/DL — HIGH (ref 70–99)
HCT VFR BLD CALC: 41.8 % — SIGNIFICANT CHANGE UP (ref 37–47)
HGB BLD-MCNC: 12.9 G/DL — SIGNIFICANT CHANGE UP (ref 12–16)
IMM GRANULOCYTES NFR BLD AUTO: 0.6 % — HIGH (ref 0.1–0.3)
LYMPHOCYTES # BLD AUTO: 1.2 K/UL — SIGNIFICANT CHANGE UP (ref 1.2–3.4)
LYMPHOCYTES # BLD AUTO: 14 % — LOW (ref 20.5–51.1)
MCHC RBC-ENTMCNC: 25.4 PG — LOW (ref 27–31)
MCHC RBC-ENTMCNC: 30.9 G/DL — LOW (ref 32–37)
MCV RBC AUTO: 82.4 FL — SIGNIFICANT CHANGE UP (ref 81–99)
MONOCYTES # BLD AUTO: 0.6 K/UL — SIGNIFICANT CHANGE UP (ref 0.1–0.6)
MONOCYTES NFR BLD AUTO: 7 % — SIGNIFICANT CHANGE UP (ref 1.7–9.3)
NEUTROPHILS # BLD AUTO: 6.73 K/UL — HIGH (ref 1.4–6.5)
NEUTROPHILS NFR BLD AUTO: 78.3 % — HIGH (ref 42.2–75.2)
NRBC # BLD: 0 /100 WBCS — SIGNIFICANT CHANGE UP (ref 0–0)
PLATELET # BLD AUTO: 315 K/UL — SIGNIFICANT CHANGE UP (ref 130–400)
POTASSIUM SERPL-MCNC: 4.7 MMOL/L — SIGNIFICANT CHANGE UP (ref 3.5–5)
POTASSIUM SERPL-SCNC: 4.7 MMOL/L — SIGNIFICANT CHANGE UP (ref 3.5–5)
RBC # BLD: 5.07 M/UL — SIGNIFICANT CHANGE UP (ref 4.2–5.4)
RBC # FLD: 16 % — HIGH (ref 11.5–14.5)
SODIUM SERPL-SCNC: 139 MMOL/L — SIGNIFICANT CHANGE UP (ref 135–146)
WBC # BLD: 8.59 K/UL — SIGNIFICANT CHANGE UP (ref 4.8–10.8)
WBC # FLD AUTO: 8.59 K/UL — SIGNIFICANT CHANGE UP (ref 4.8–10.8)

## 2019-03-29 RX ADMIN — Medication 100 MILLIGRAM(S): at 05:29

## 2019-03-29 RX ADMIN — Medication 1000 MILLIGRAM(S): at 05:28

## 2019-03-29 RX ADMIN — Medication 15 MILLIGRAM(S): at 13:00

## 2019-03-29 RX ADMIN — Medication 25 MILLIGRAM(S): at 05:28

## 2019-03-29 RX ADMIN — Medication 1000 MILLIGRAM(S): at 12:03

## 2019-03-29 RX ADMIN — Medication 15 MILLIGRAM(S): at 05:29

## 2019-03-29 RX ADMIN — SIMVASTATIN 20 MILLIGRAM(S): 20 TABLET, FILM COATED ORAL at 12:01

## 2019-03-29 RX ADMIN — Medication 15 MILLIGRAM(S): at 12:02

## 2019-03-29 RX ADMIN — Medication 100 MILLIGRAM(S): at 13:24

## 2019-03-29 RX ADMIN — Medication 1000 MILLIGRAM(S): at 13:00

## 2019-03-29 RX ADMIN — HEPARIN SODIUM 5000 UNIT(S): 5000 INJECTION INTRAVENOUS; SUBCUTANEOUS at 13:24

## 2019-03-29 RX ADMIN — Medication 100 MILLIGRAM(S): at 05:28

## 2019-03-29 RX ADMIN — HEPARIN SODIUM 5000 UNIT(S): 5000 INJECTION INTRAVENOUS; SUBCUTANEOUS at 05:29

## 2019-03-29 RX ADMIN — Medication 25 MICROGRAM(S): at 05:28

## 2019-03-29 NOTE — CONSULT NOTE ADULT - ASSESSMENT
small thermal duodenal injury.  Figure of 8 stich with vicryl taken   small vascularized mesenteric patch taken to bolster the stich.

## 2019-03-29 NOTE — DISCHARGE NOTE PROVIDER - HOSPITAL COURSE
Pt was admitted to the hospital and went to the OR. She underwent a right robotic radical nephrectomy.    The pt did well and was d/c home on POD # 1

## 2019-03-29 NOTE — DISCHARGE NOTE PROVIDER - NSDCACTIVITY_GEN_ALL_CORE
Stairs allowed/Walking - Outdoors allowed/Walking - Indoors allowed/No heavy lifting/straining/Showering allowed

## 2019-03-29 NOTE — PROGRESS NOTE ADULT - ATTENDING COMMENTS
Pt seen in the AM and PM today. She looks well. Abd soft nd/nt. Clayton serosang w minimal output today. Tolerating soft reg diet. No n/v. avss.  she would like to go home which I am ok with. I gave her my personal cell phone and strict er precautions if any worsening of sx.

## 2019-03-29 NOTE — DISCHARGE NOTE PROVIDER - CARE PROVIDER_API CALL
Alejandro Tipton)  [facility]  [address] [place] [address], [address]  [place] [address]  Phone: [phone]  Fax: [phone]  Follow Up Time:

## 2019-03-29 NOTE — PROGRESS NOTE ADULT - ASSESSMENT
54 y/o female s/p Right robotic radical nephrectomy POD # 1, also had duodenal serosal reapproximation. Seen and examined at bedside, doing well without any complaints. Does have some mild aleida incisional tenderness.   Pt seen and examined without complaints. Pain is controlled. Denies SOB/CP/N/V.      A) stable POD # 1  s/p right robotic radical nephrectomy    P) d/c Bernal  increase ambulation  prepare for d/c  Possible d/c later today.  d/w Dr. Tipton

## 2019-03-29 NOTE — DISCHARGE NOTE NURSING/CASE MANAGEMENT/SOCIAL WORK - NSDCPNDISPN_GEN_ALL_CORE
Activities of daily living, including home environment that might     exacerbate pain or reduce effectiveness of the pain management plan of care as well as strategies to address these issues

## 2019-03-29 NOTE — CHART NOTE - NSCHARTNOTEFT_GEN_A_CORE
pt examined in morning  doing well   minimal incisional pain and tenderness.   has ambulated.   was advised to start liquid diet .  advance as tolerated to full liquid diet and advance gradually to regular diet. pt examined in morning  doing well   minimal incisional pain and tenderness.   has ambulated.   was advised to start liquid diet .  advance as tolerated to full liquid diet and advance gradually to regular diet.  Patient already discharged by Primary team.

## 2019-03-29 NOTE — DISCHARGE NOTE NURSING/CASE MANAGEMENT/SOCIAL WORK - NSDCDPATPORTLINK_GEN_ALL_CORE
You can access the [facility] Patient Portal, offered by [facility], by registering with the following website: [email or web address]

## 2019-03-29 NOTE — PROGRESS NOTE ADULT - SUBJECTIVE AND OBJECTIVE BOX
Progress Note POD # 1    Subjective 54 y/o female s/p Right robotic radical nephrectomy POD # 1, also had duodenal serosal reapproximation. Seen and examined at bedside, doing well without any complaints. Does have some mild aleida incisional tenderness.   Pt seen and examined without complaints. Pain is controlled. Denies SOB/CP/N/V.        Vital signs  T(C): , Max: 36.9 (03-28-19 @ 14:50)  HR: 63 (03-29-19 @ 07:40)  BP: 144/79 (03-29-19 @ 07:40)  SpO2: 98% (03-29-19 @ 08:03)    Gen in NAD, sitting in a chair  Abd soft with mild incisional tenderness  MAXIMILIANO with serosanguinous drainage 60 cc last shift, 100 cc total   Bernal drains clear urine    Labs                        12.9   8.59  )-----------( 315      ( 29 Mar 2019 06:46 )             41.8     29 Mar 2019 06:46    139    |  101    |  18     ----------------------------<  128    4.7     |  21     |  1.2      Ca    8.8        29 Mar 2019 06:46

## 2019-04-01 PROBLEM — I48.91 UNSPECIFIED ATRIAL FIBRILLATION: Chronic | Status: ACTIVE | Noted: 2019-03-14

## 2019-04-01 PROBLEM — I25.810 ATHEROSCLEROSIS OF CORONARY ARTERY BYPASS GRAFT(S) WITHOUT ANGINA PECTORIS: Chronic | Status: ACTIVE | Noted: 2019-03-14

## 2019-04-01 PROBLEM — E66.9 OBESITY, UNSPECIFIED: Chronic | Status: ACTIVE | Noted: 2019-03-14

## 2019-04-02 LAB — SURGICAL PATHOLOGY STUDY: SIGNIFICANT CHANGE UP

## 2019-04-03 DIAGNOSIS — N28.89 OTHER SPECIFIED DISORDERS OF KIDNEY AND URETER: ICD-10-CM

## 2019-04-04 ENCOUNTER — APPOINTMENT (OUTPATIENT)
Dept: UROLOGY | Facility: CLINIC | Age: 54
End: 2019-04-04
Payer: MEDICAID

## 2019-04-04 PROCEDURE — 99024 POSTOP FOLLOW-UP VISIT: CPT

## 2019-04-04 NOTE — ASSESSMENT
[FreeTextEntry1] : 53 F sp right robotic radical nephrectomy for a 7 cm tumor, path T1b (7cm) ccRCC FG2, margins NEGATIVE, foci of cystic/myxoid changes. Doing well. \par Pt restarting all anticoagulation.\par Fu 6 mo, CXR, CT ABD PEVLIS, BMP prior for RCC surveillance\par

## 2019-04-04 NOTE — LETTER BODY
[Dear  ___] : Dear  [unfilled], [Consult Letter:] : I had the pleasure of evaluating your patient, [unfilled]. [Please see my note below.] : Please see my note below. [Consult Closing:] : Thank you very much for allowing me to participate in the care of this patient.  If you have any questions, please do not hesitate to contact me. [Sincerely,] : Sincerely, [FreeTextEntry2] : Carol Mckeon MD [FreeTextEntry3] : Alejandro Tipton MD\par Robotic Urologic and Minimally Invasive Surgery\par [facility]\par Division of Urology\par

## 2019-04-04 NOTE — HISTORY OF PRESENT ILLNESS
[FreeTextEntry1] : 53 F sp right robotic radical nephrectomy for a 7 cm tumor, path T1b (7cm) ccRCC FG2, foci of cystic/myxoid changes. Doing well. DCed POD 1\par \par No f/c. normal bms.\par denies cp sob.\par pt very satisfied.

## 2019-04-04 NOTE — LETTER HEADER
[FreeTextEntry3] : Alejandro Tipton MD\par Robotic Urologic and Minimally Invasive Surgery\par \par [facility]\par Division of Urology\par [address]\par [address]\[place] [address]\par Tel [phone]\par Fax [phone]\par \par

## 2019-04-04 NOTE — PHYSICAL EXAM
[Abdomen Soft] : soft [Abdomen Tenderness] : non-tender [Costovertebral Angle Tenderness] : no ~M costovertebral angle tenderness [FreeTextEntry1] : well healed incisions

## 2019-09-17 ENCOUNTER — OTHER (OUTPATIENT)
Age: 54
End: 2019-09-17

## 2019-09-29 ENCOUNTER — FORM ENCOUNTER (OUTPATIENT)
Age: 54
End: 2019-09-29

## 2019-09-30 ENCOUNTER — OUTPATIENT (OUTPATIENT)
Dept: OUTPATIENT SERVICES | Facility: HOSPITAL | Age: 54
LOS: 1 days | Discharge: HOME | End: 2019-09-30
Payer: MEDICAID

## 2019-09-30 DIAGNOSIS — N28.89 OTHER SPECIFIED DISORDERS OF KIDNEY AND URETER: ICD-10-CM

## 2019-09-30 DIAGNOSIS — I25.810 ATHEROSCLEROSIS OF CORONARY ARTERY BYPASS GRAFT(S) WITHOUT ANGINA PECTORIS: Chronic | ICD-10-CM

## 2019-09-30 PROCEDURE — 71046 X-RAY EXAM CHEST 2 VIEWS: CPT | Mod: 26

## 2019-09-30 PROCEDURE — 74177 CT ABD & PELVIS W/CONTRAST: CPT | Mod: 26

## 2019-10-08 ENCOUNTER — APPOINTMENT (OUTPATIENT)
Dept: UROLOGY | Facility: CLINIC | Age: 54
End: 2019-10-08
Payer: MEDICAID

## 2019-10-08 PROCEDURE — 99214 OFFICE O/P EST MOD 30 MIN: CPT

## 2019-10-08 NOTE — HISTORY OF PRESENT ILLNESS
[FreeTextEntry1] : 53 F sp right robotic radical nephrectomy for a 7 cm tumor, path T1b (7cm) ccRCC FG2, foci of cystic/myxoid changes. \par \par CT ap\par CXR concepcion\par Cr 1.05 GFR 60\par \par Doing well. no pain. no hematuria or wt loss.

## 2019-10-08 NOTE — ASSESSMENT
[FreeTextEntry1] : 53 F sp right robotic radical nephrectomy for a 7 cm tumor, path T1b (7cm) ccRCC FG2, margins NEGATIVE, foci of cystic/myxoid changes. Doing well. \par ALEXANDER\par Fu 6 mo, CT CHEST ABD PEVLIS, CMP prior for RCC surveillance\par

## 2019-10-08 NOTE — LETTER HEADER
[FreeTextEntry3] : Alejandro Tipton MD\par Director of Robotic and Minimally Invasive Urologic Surgery\par \par [facility]\par Division of Urology\par [address]\par [address]\[place] [address]\par Tel [phone]\par Fax [phone]\par \par

## 2019-10-08 NOTE — LETTER BODY
[Dear  ___] : Dear  [unfilled], [Consult Letter:] : I had the pleasure of evaluating your patient, [unfilled]. [Please see my note below.] : Please see my note below. [Consult Closing:] : Thank you very much for allowing me to participate in the care of this patient.  If you have any questions, please do not hesitate to contact me. [Sincerely,] : Sincerely, [FreeTextEntry2] : Tulio Guerra MD\par [address]\[place] [address]\par  [FreeTextEntry3] : Alejandro Tipton MD\par Director of Robotic and Minimally Invasive Urologic Surgery\par [facility]\par

## 2019-10-08 NOTE — PHYSICAL EXAM
[Abdomen Soft] : soft [Abdomen Tenderness] : non-tender [Costovertebral Angle Tenderness] : no ~M costovertebral angle tenderness [General Appearance - Well Developed] : well developed [General Appearance - Well Nourished] : well nourished [Normal Appearance] : normal appearance [Well Groomed] : well groomed [General Appearance - In No Acute Distress] : no acute distress [FreeTextEntry1] : well healed incisions [Urinary Bladder Findings] : the bladder was normal on palpation [Edema] : no peripheral edema [] : no respiratory distress [Exaggerated Use Of Accessory Muscles For Inspiration] : no accessory muscle use [Respiration, Rhythm And Depth] : normal respiratory rhythm and effort [Oriented To Time, Place, And Person] : oriented to person, place, and time [Affect] : the affect was normal [Mood] : the mood was normal [Not Anxious] : not anxious [Normal Station and Gait] : the gait and station were normal for the patient's age [No Focal Deficits] : no focal deficits [No Palpable Adenopathy] : no palpable adenopathy

## 2020-04-03 ENCOUNTER — RESULT REVIEW (OUTPATIENT)
Age: 55
End: 2020-04-03

## 2020-04-03 ENCOUNTER — OUTPATIENT (OUTPATIENT)
Dept: OUTPATIENT SERVICES | Facility: HOSPITAL | Age: 55
LOS: 1 days | Discharge: HOME | End: 2020-04-03
Payer: MEDICAID

## 2020-04-03 DIAGNOSIS — I25.810 ATHEROSCLEROSIS OF CORONARY ARTERY BYPASS GRAFT(S) WITHOUT ANGINA PECTORIS: Chronic | ICD-10-CM

## 2020-04-03 DIAGNOSIS — C64.9 MALIGNANT NEOPLASM OF UNSPECIFIED KIDNEY, EXCEPT RENAL PELVIS: ICD-10-CM

## 2020-04-03 PROCEDURE — 74177 CT ABD & PELVIS W/CONTRAST: CPT | Mod: 26

## 2020-04-03 PROCEDURE — 71260 CT THORAX DX C+: CPT | Mod: 26

## 2020-04-07 ENCOUNTER — APPOINTMENT (OUTPATIENT)
Dept: UROLOGY | Facility: CLINIC | Age: 55
End: 2020-04-07
Payer: COMMERCIAL

## 2020-04-07 PROCEDURE — 99215 OFFICE O/P EST HI 40 MIN: CPT | Mod: 95

## 2020-04-07 NOTE — LETTER HEADER
[FreeTextEntry3] : Alejandro Tipton MD\par Director of Robotic and Minimally Invasive Urologic Surgery\par \par [facility]\par Division of Urology\par [address]\par [address]\[place] [address]\par Tel [phone]\par Fax [phone]\par \par

## 2020-04-07 NOTE — ASSESSMENT
[FreeTextEntry1] : 53 F sp right robotic radical nephrectomy for a 7 cm tumor, path T1b (7cm) ccRCC FG2, foci of cystic/myxoid changes, neg margins, one hilar lymph node neg.\par CT shows new pulmonary nodules concerning for metastatic disease, along with decreasing mediastinal lymphadenopathy. \par However patient has flu/corona like symptoms, she will discuss this with PCP today and I have advised quarantine and corona testing if possible.\par \par Discussed w Dr Burks of oncology today who has contacted Dr Sotelo of thoracic surgery. Given this is likely oligometastatic disease, these lesions should be resected if possible.\par Dr Sotelo will see the patient to discuss surgery. \par I agree w this plan.

## 2020-04-07 NOTE — LETTER BODY
[Dear  ___] : Dear  [unfilled], [Consult Letter:] : I had the pleasure of evaluating your patient, [unfilled]. [Please see my note below.] : Please see my note below. [Consult Closing:] : Thank you very much for allowing me to participate in the care of this patient.  If you have any questions, please do not hesitate to contact me. [Sincerely,] : Sincerely, [FreeTextEntry2] : Tulio Guerra MD\par [address]\[place] [address]\par  [FreeTextEntry3] : Alejandro Tipton MD\par Director of Robotic and Minimally Invasive Urologic Surgery\par [facility]\par

## 2020-04-07 NOTE — LETTER HEADER
[FreeTextEntry3] : Alejandro Tipton MD\par Director of Robotic and Minimally Invasive Urologic Surgery\par \par [facility]\par Division of Urology\par [address]\par [address]\[place] [address]\par Tel [phone]\par Fax [phone]\par \par

## 2020-04-07 NOTE — HISTORY OF PRESENT ILLNESS
[Home] : at home, [unfilled] , at the time of the visit. [Other Location: e.g. Home (Enter Location, City,State)___] : at [unfilled] [Partner] : partner [Patient] : the patient [Self] : self [FreeTextEntry1] : 53 F sp right robotic radical nephrectomy for a 7 cm tumor, path T1b (7cm) ccRCC FG2, foci of cystic/myxoid changes, neg margins, one hilar lymph node neg.\par \par Reports that in the last week she has had myalgia, cough, malaise. +Nausea.\par Denies anorexia or wt change.\par No hematuria. NO flank pain.\par \par CT c/a/p 4/3/20 images visualized show interval development of b/l pulmonary nodules including development of 1.6 cm pleural based nodule along w multiple sub cm nodules. Interval shrinkage of mediastinal lymphadenopathy.\par No e/o metastatic disease in abdomen/pelvis, left hepatic lobe lesion hemangioma. \par Cr 0.94 3/2020\par \par Of note ~1/2019 pt was first dx w influeneza, CXR revealed a hilar cavitary mass and chest CT showed --1. Extensive mediastinal, bilateral hilar, and right supraclavicular \par lymphadenopathy \par She had a follow up PET scan approx 3 weeks later which was neg for any metastatic disease in the C/A/P.\par

## 2020-04-07 NOTE — ADDENDUM
[FreeTextEntry1] : The patient-doctor relationship has been established in a face to face fashion via real time video/audio HIPAA compliant communication using telemedicine software. The patient's identity has been confirmed. The patient was previously emailed a copy of the telemedicine consent. They have had a chance to review and has now given verbal consent and has requested care to be assessed and treated through telemedicine and understands there may be limitations in this process and they may need further follow up care in the office and or hospital settings.\par

## 2020-04-07 NOTE — PHYSICAL EXAM
[General Appearance - Well Developed] : well developed [General Appearance - Well Nourished] : well nourished [] : no respiratory distress [Exaggerated Use Of Accessory Muscles For Inspiration] : no accessory muscle use [Oriented To Time, Place, And Person] : oriented to person, place, and time [Affect] : the affect was normal [Mood] : the mood was normal [Not Anxious] : not anxious [Normal Station and Gait] : the gait and station were normal for the patient's age

## 2020-04-07 NOTE — LETTER BODY
[Dear  ___] : Dear  [unfilled], [Consult Letter:] : I had the pleasure of evaluating your patient, [unfilled]. [Please see my note below.] : Please see my note below. [Consult Closing:] : Thank you very much for allowing me to participate in the care of this patient.  If you have any questions, please do not hesitate to contact me. [Sincerely,] : Sincerely, [FreeTextEntry2] : Tulio Guerra MD\par [address]\[place] [address]\par  [FreeTextEntry3] : Alejandro Tipton MD\par Director of Robotic and Minimally Invasive Urologic Surgery\par [facility]\par

## 2020-04-13 ENCOUNTER — APPOINTMENT (OUTPATIENT)
Dept: UROLOGY | Facility: CLINIC | Age: 55
End: 2020-04-13

## 2020-04-24 ENCOUNTER — OUTPATIENT (OUTPATIENT)
Dept: OUTPATIENT SERVICES | Facility: HOSPITAL | Age: 55
LOS: 1 days | Discharge: HOME | End: 2020-04-24
Payer: COMMERCIAL

## 2020-04-24 ENCOUNTER — RESULT REVIEW (OUTPATIENT)
Age: 55
End: 2020-04-24

## 2020-04-24 DIAGNOSIS — C64.9 MALIGNANT NEOPLASM OF UNSPECIFIED KIDNEY, EXCEPT RENAL PELVIS: ICD-10-CM

## 2020-04-24 DIAGNOSIS — I25.810 ATHEROSCLEROSIS OF CORONARY ARTERY BYPASS GRAFT(S) WITHOUT ANGINA PECTORIS: Chronic | ICD-10-CM

## 2020-04-24 LAB — GLUCOSE BLDC GLUCOMTR-MCNC: 127 MG/DL — HIGH (ref 70–99)

## 2020-04-24 PROCEDURE — 78815 PET IMAGE W/CT SKULL-THIGH: CPT | Mod: 26,PS

## 2020-05-04 ENCOUNTER — APPOINTMENT (OUTPATIENT)
Dept: HEMATOLOGY ONCOLOGY | Facility: CLINIC | Age: 55
End: 2020-05-04

## 2020-06-04 ENCOUNTER — TRANSCRIPTION ENCOUNTER (OUTPATIENT)
Age: 55
End: 2020-06-04

## 2020-07-24 ENCOUNTER — OUTPATIENT (OUTPATIENT)
Dept: OUTPATIENT SERVICES | Facility: HOSPITAL | Age: 55
LOS: 1 days | Discharge: HOME | End: 2020-07-24
Payer: COMMERCIAL

## 2020-07-24 DIAGNOSIS — R91.8 OTHER NONSPECIFIC ABNORMAL FINDING OF LUNG FIELD: ICD-10-CM

## 2020-07-24 DIAGNOSIS — I25.810 ATHEROSCLEROSIS OF CORONARY ARTERY BYPASS GRAFT(S) WITHOUT ANGINA PECTORIS: Chronic | ICD-10-CM

## 2020-07-24 PROCEDURE — 71250 CT THORAX DX C-: CPT | Mod: 26

## 2020-07-28 ENCOUNTER — APPOINTMENT (OUTPATIENT)
Dept: UROLOGY | Facility: CLINIC | Age: 55
End: 2020-07-28
Payer: COMMERCIAL

## 2020-07-28 DIAGNOSIS — R59.0 LOCALIZED ENLARGED LYMPH NODES: ICD-10-CM

## 2020-07-28 PROCEDURE — 99215 OFFICE O/P EST HI 40 MIN: CPT

## 2020-07-28 NOTE — PHYSICAL EXAM
[General Appearance - Well Developed] : well developed [Normal Appearance] : normal appearance [General Appearance - Well Nourished] : well nourished [Well Groomed] : well groomed [General Appearance - In No Acute Distress] : no acute distress [Abdomen Soft] : soft [Abdomen Tenderness] : non-tender [Costovertebral Angle Tenderness] : no ~M costovertebral angle tenderness [FreeTextEntry1] : +ventral reducible hernia [Urinary Bladder Findings] : the bladder was normal on palpation [Edema] : no peripheral edema [] : no respiratory distress [Respiration, Rhythm And Depth] : normal respiratory rhythm and effort [Exaggerated Use Of Accessory Muscles For Inspiration] : no accessory muscle use [Oriented To Time, Place, And Person] : oriented to person, place, and time [Affect] : the affect was normal [Mood] : the mood was normal [Not Anxious] : not anxious [Normal Station and Gait] : the gait and station were normal for the patient's age [No Focal Deficits] : no focal deficits [No Palpable Adenopathy] : no palpable adenopathy

## 2020-07-28 NOTE — ASSESSMENT
[FreeTextEntry1] : 53 F sp right robotic radical nephrectomy for a 7 cm tumor, path T1b (7cm) ccRCC FG2, foci of cystic/myxoid changes, neg margins, one hilar lymph node neg.\par CT showed new pulmonary nodules concerning for metastatic disease now resolved, was likely secondary to covid19.\par Currently w reducible ventral hernia.\par \par Recommend CT a/p and referral to hernia center\par CT c/a/p 6 mo surveillance rcc, cmp\par d/w Dr Burks of onc and gave him the update on the great news of no e/o metastatic disease

## 2020-07-28 NOTE — LETTER HEADER
[FreeTextEntry3] : Alejandro Tipton MD\par Director of Robotic and Minimally Invasive Urologic Surgery\par \par [facility]\par Division of Urology\par [address]\[place] [address]\par Tel [phone]\par Fax [phone]\par \par

## 2020-07-28 NOTE — HISTORY OF PRESENT ILLNESS
[FreeTextEntry1] : 53 F sp right robotic radical nephrectomy for a 7 cm tumor, path T1b (7cm) ccRCC FG2, foci of cystic/myxoid changes, neg margins, one hilar lymph node neg.\par CT showed new pulmonary nodules concerning for metastatic disease, along with decreasing mediastinal lymphadenopathy. \par \par Patient reports she is doing well and she recovered from coronavirus.\par No hematuria. NO flank pain. States has new non painful abdominal bulge which developed after severe coughing when pt had covid.\par \par CT chest images visualized 7/2020 and compared with April 2020 there is complete resolution of the patient's bilateral pulmonary nodules and opacities\par CT c/a/p 4/3/20 images show interval development of b/l pulmonary nodules including development of 1.6 cm pleural based nodule along w multiple sub cm nodules. Interval shrinkage of mediastinal lymphadenopathy.\par No e/o metastatic disease in abdomen/pelvis, left hepatic lobe lesion hemangioma. \par Cr 0.94 3/2020\par \par Of note ~1/2019 pt was first dx w influeneza, CXR revealed a hilar cavitary mass and chest CT showed --1. Extensive mediastinal, bilateral hilar, and right supraclavicular \par lymphadenopathy \par She had a follow up PET scan approx 3 weeks later which was neg for any metastatic disease in the C/A/P.\par

## 2020-07-28 NOTE — LETTER BODY
[Dear  ___] : Dear  [unfilled], [Consult Letter:] : I had the pleasure of evaluating your patient, [unfilled]. [Please see my note below.] : Please see my note below. [Consult Closing:] : Thank you very much for allowing me to participate in the care of this patient.  If you have any questions, please do not hesitate to contact me. [Sincerely,] : Sincerely, [FreeTextEntry2] : Tulio Guerra MD\par [address]\[place] [address]\par  [FreeTextEntry3] : Alejandro Tipton MD\par Director of Robotic and Minimally Invasive Urologic Surgery\par [facility]\par

## 2020-09-03 ENCOUNTER — APPOINTMENT (OUTPATIENT)
Dept: SURGERY | Facility: CLINIC | Age: 55
End: 2020-09-03
Payer: COMMERCIAL

## 2020-09-03 VITALS — HEIGHT: 66 IN | WEIGHT: 293 LBS | BODY MASS INDEX: 47.09 KG/M2

## 2020-09-03 DIAGNOSIS — Z87.19 PERSONAL HISTORY OF OTHER DISEASES OF THE DIGESTIVE SYSTEM: ICD-10-CM

## 2020-09-03 PROCEDURE — 99214 OFFICE O/P EST MOD 30 MIN: CPT

## 2020-09-03 NOTE — CONSULT LETTER
[Dear  ___] : Dear  [unfilled], [Consult Closing:] : Thank you very much for allowing me to participate in the care of this patient.  If you have any questions, please do not hesitate to contact me. [Courtesy Letter:] : I had the pleasure of seeing your patient, [unfilled], in my office today. [Please see my note below.] : Please see my note below. [DrMariajose  ___] : Dr. VITAL [DrMariajose ___] : Dr. VITAL [FreeTextEntry3] : Respectfully,\par \par Toribio Denny M.D., FACS\par

## 2020-09-03 NOTE — PHYSICAL EXAM
[JVD] : no jugular venous distention  [Normal Breath Sounds] : Normal breath sounds [No Rash or Lesion] : No rash or lesion [Alert] : alert [Calm] : calm [de-identified] : overweight [de-identified] : normal [de-identified] : protuberant abdomen, large diastasis recti\par  [de-identified] : normal testicles [de-identified] : large reducible incisional hernia

## 2020-09-03 NOTE — ASSESSMENT
[FreeTextEntry1] : Florence is a pleasant 55-year-old  with a past medical history significant for hypertension, hypercholesterolemia, HU of fibrillation on Eliquis, coronary artery disease status post CABG in 1993 (she has a strong family history), hypothyroidism, arthritis and morbid obesity along with an uneventful robotic-assisted right nephrectomy for a 7 cm renal carcinoma in 2019 now presenting to the office with pain and swelling in the mid upper abdomen associated with the superior aspect of her midline incision suspicious for a hernia. She recently underwent a CT scan of the abdomen and pelvis confirming this diagnosis and I was able to review the images and the report with the Florence today.\par \par Physical examination demonstrates a large protuberant abdomen with a moderate to large diastasis recti likely related to her excess abdominal weight. She also has an orange-sized bulge just superior left lateral to her midline incision which is tender to palpation but reducible with minimal difficulty consistent with a large incisional/ventral hernia warranting surgical repair. She believes she developed a hernia after several weeks of a profound cough when she was Covid-19 positive in March/April 2020. There is no evidence of incarceration or strangulation, and the patient denies any symptoms of obstruction.  Her current BMI is 49.\par \par I explained the pros and cons of surgery, as well as all risks, benefits, indications and alternatives of the procedure and the patient understood and agreed. She is currently planning on an angiogram due to atypical chest and back pain ordered by her cardiologist. Once this has been done and reviewed and she has been cleared for surgical intervention, she will call us back to schedule her procedure. Florence is aware that the repair of her large incisional hernia with mesh will be done under LOCAL with IV SEDATION at [facility] with presurgical testing preceding this date. She was encouraged to avoid heavy lifting and strenuous activity in the interim, of course. She will need to hold her Eliquis 3 days prior to surgery and may resume it the day following her procedure.  We also discussed the importance of calorie restriction and healthy eating with regard to weight loss, hernia recurrence and her overall health.

## 2020-09-06 ENCOUNTER — OUTPATIENT (OUTPATIENT)
Dept: OUTPATIENT SERVICES | Facility: HOSPITAL | Age: 55
LOS: 1 days | Discharge: HOME | End: 2020-09-06

## 2020-09-06 DIAGNOSIS — I25.810 ATHEROSCLEROSIS OF CORONARY ARTERY BYPASS GRAFT(S) WITHOUT ANGINA PECTORIS: Chronic | ICD-10-CM

## 2020-09-06 DIAGNOSIS — Z11.59 ENCOUNTER FOR SCREENING FOR OTHER VIRAL DISEASES: ICD-10-CM

## 2020-09-12 ENCOUNTER — OUTPATIENT (OUTPATIENT)
Dept: OUTPATIENT SERVICES | Facility: HOSPITAL | Age: 55
LOS: 1 days | Discharge: HOME | End: 2020-09-12

## 2020-09-12 DIAGNOSIS — I25.810 ATHEROSCLEROSIS OF CORONARY ARTERY BYPASS GRAFT(S) WITHOUT ANGINA PECTORIS: Chronic | ICD-10-CM

## 2020-09-12 DIAGNOSIS — Z11.59 ENCOUNTER FOR SCREENING FOR OTHER VIRAL DISEASES: ICD-10-CM

## 2020-09-15 ENCOUNTER — INPATIENT (INPATIENT)
Facility: HOSPITAL | Age: 55
LOS: 1 days | Discharge: HOME | End: 2020-09-17
Attending: INTERNAL MEDICINE | Admitting: INTERNAL MEDICINE
Payer: COMMERCIAL

## 2020-09-15 VITALS
RESPIRATION RATE: 12 BRPM | DIASTOLIC BLOOD PRESSURE: 84 MMHG | HEART RATE: 92 BPM | OXYGEN SATURATION: 98 % | SYSTOLIC BLOOD PRESSURE: 124 MMHG | HEIGHT: 66 IN | WEIGHT: 293 LBS

## 2020-09-15 DIAGNOSIS — I25.810 ATHEROSCLEROSIS OF CORONARY ARTERY BYPASS GRAFT(S) WITHOUT ANGINA PECTORIS: Chronic | ICD-10-CM

## 2020-09-15 LAB
ANION GAP SERPL CALC-SCNC: 17 MMOL/L — HIGH (ref 7–14)
BUN SERPL-MCNC: 17 MG/DL — SIGNIFICANT CHANGE UP (ref 10–20)
CALCIUM SERPL-MCNC: 9.4 MG/DL — SIGNIFICANT CHANGE UP (ref 8.5–10.1)
CHLORIDE SERPL-SCNC: 101 MMOL/L — SIGNIFICANT CHANGE UP (ref 98–110)
CO2 SERPL-SCNC: 21 MMOL/L — SIGNIFICANT CHANGE UP (ref 17–32)
CREAT SERPL-MCNC: 1.1 MG/DL — SIGNIFICANT CHANGE UP (ref 0.7–1.5)
GLUCOSE SERPL-MCNC: 138 MG/DL — HIGH (ref 70–99)
HCT VFR BLD CALC: 46.3 % — SIGNIFICANT CHANGE UP (ref 37–47)
HGB BLD-MCNC: 14.3 G/DL — SIGNIFICANT CHANGE UP (ref 12–16)
MCHC RBC-ENTMCNC: 25.2 PG — LOW (ref 27–31)
MCHC RBC-ENTMCNC: 30.9 G/DL — LOW (ref 32–37)
MCV RBC AUTO: 81.7 FL — SIGNIFICANT CHANGE UP (ref 81–99)
NRBC # BLD: 0 /100 WBCS — SIGNIFICANT CHANGE UP (ref 0–0)
PLATELET # BLD AUTO: 347 K/UL — SIGNIFICANT CHANGE UP (ref 130–400)
POTASSIUM SERPL-MCNC: 4.1 MMOL/L — SIGNIFICANT CHANGE UP (ref 3.5–5)
POTASSIUM SERPL-SCNC: 4.1 MMOL/L — SIGNIFICANT CHANGE UP (ref 3.5–5)
RBC # BLD: 5.67 M/UL — HIGH (ref 4.2–5.4)
RBC # FLD: 15.5 % — HIGH (ref 11.5–14.5)
SODIUM SERPL-SCNC: 139 MMOL/L — SIGNIFICANT CHANGE UP (ref 135–146)
WBC # BLD: 8.68 K/UL — SIGNIFICANT CHANGE UP (ref 4.8–10.8)
WBC # FLD AUTO: 8.68 K/UL — SIGNIFICANT CHANGE UP (ref 4.8–10.8)

## 2020-09-15 RX ORDER — CLOPIDOGREL BISULFATE 75 MG/1
75 TABLET, FILM COATED ORAL DAILY
Refills: 0 | Status: DISCONTINUED | OUTPATIENT
Start: 2020-09-16 | End: 2020-09-17

## 2020-09-15 RX ORDER — ENOXAPARIN SODIUM 100 MG/ML
40 INJECTION SUBCUTANEOUS DAILY
Refills: 0 | Status: DISCONTINUED | OUTPATIENT
Start: 2020-09-15 | End: 2020-09-15

## 2020-09-15 RX ORDER — INFLUENZA VIRUS VACCINE 15; 15; 15; 15 UG/.5ML; UG/.5ML; UG/.5ML; UG/.5ML
0.5 SUSPENSION INTRAMUSCULAR ONCE
Refills: 0 | Status: DISCONTINUED | OUTPATIENT
Start: 2020-09-15 | End: 2020-09-17

## 2020-09-15 RX ORDER — LEVOTHYROXINE SODIUM 125 MCG
25 TABLET ORAL DAILY
Refills: 0 | Status: DISCONTINUED | OUTPATIENT
Start: 2020-09-15 | End: 2020-09-17

## 2020-09-15 RX ORDER — SODIUM CHLORIDE 9 MG/ML
1000 INJECTION INTRAMUSCULAR; INTRAVENOUS; SUBCUTANEOUS
Refills: 0 | Status: DISCONTINUED | OUTPATIENT
Start: 2020-09-15 | End: 2020-09-17

## 2020-09-15 RX ORDER — ASPIRIN/CALCIUM CARB/MAGNESIUM 324 MG
81 TABLET ORAL DAILY
Refills: 0 | Status: DISCONTINUED | OUTPATIENT
Start: 2020-09-16 | End: 2020-09-17

## 2020-09-15 NOTE — CHART NOTE - NSCHARTNOTEFT_GEN_A_CORE
Pre cath note:    indication:  [ ] STEMI                [ ] NSTEMI                 [ ] Acute coronary syndrome                     [ ]Unstable Angina   [ ] high risk  [ ] intermediate risk  [ ] low risk                     [X] Stable Angina     non-invasive testing:                          Date:                     result: [ ] high risk  [ ] intermediate risk  [ ] low risk    Anti- Anginal medications:                    [ ] not used                       [X] used                   [ ] not used but strong indication not to use    Ejection Fraction                   [ ] <29            [ ] 30-39%   [X] 40-49%     [ ]>50%    CHF                   [ ] active (within last 14 days on meds   [ ] Chronic (on meds but no exacerbation)    COPD                   [ ] mild (on chronic bronchodilators)  [ ] moderate (on chronic steroid therapy)      [ ] severe (indication for home O2 or PACO2 >50)    Other risk factors:                       [X] Previous MI                     [ ] CVA/ stroke                    [ ] carotid stent/ CEA                    [ ] PVD/PAD- (arterial aneurysm, non-palpable pulses, tortuous vessel with inability to insert catheter, infra-renal dissection, renal or subclavian artery stenosis)                    [X] diabetic                    [ ] previous CABG                    [ ] Renal Failure                           14.3   8.68  )-----------( 347      ( 15 Sep 2020 07:00 )             46.3     09-15    139  |  101  |  17  ----------------------------<  138<H>  4.1   |  21  |  1.1    Ca    9.4      15 Sep 2020 07:00                           -Patient Schedule for LHC/PCI today  -Keep NPO after midnight  -Hold Anticoagulation prior to PCI: Eliquis last taken on 9/11  -Start IV Fluids NS at : 3ml/Kg for 1 Hr prior to procedure

## 2020-09-15 NOTE — H&P CARDIOLOGY - HISTORY OF PRESENT ILLNESS
55 y/old F here for LHC due to SOB and CP that radiates to her back, CP started 1.5 month ago and gets worse with activity   PMH: ASHD, Hypercholesterolemia, HTN, Hypothyroid A-Fib, Morbidly obese, Hernia  PSH: CABG 1993 3V, Right Kidney removed due to Ca 2019  Last dose of Eliquis 9/11/2020

## 2020-09-15 NOTE — CHART NOTE - NSCHARTNOTEFT_GEN_A_CORE
PRE-OP DIAGNOSIS: Abnormal stress test     PROCEDURE:[  ] LHC                         [ x ] Coronary angiography                         [  ] [facility]  Physician: Dr Roth  Assistant: Sandra    ANESTHESIA TYPE:  [  ]General Anesthesia  [ x ] Sedation  [ x ] Local/Regional    ESTIMATED BLOOD LOSS:    10   mL    CONDITION  [  ] Critical  [  ] Serious  [ x ]Fair  [  ]Good    IV CONTRAST:      150       mL    FINDINGS  Left Heart Catheterization:  LVEF%: 45% by echo      ACCESS:    [x ] left radial artery  [ ] right femoral artery    LEFT HEART CATHETERIZATION                                    Left main: severe disease  LAD: 100% occlusion at the ostium. Distal vessel supplied by a patent LIMA graft. L to R collaterals   Diag: luminal irregularities  Left Circumflex: 90% ostial stenosis, moderate disease distally.   OM: 95% stenosis at SVG anastomosis. Distal vessel showing luminal irregularities.   Right Coronary Artery: not injected known occluded   RPDA: supplied by collaterals from the distal LAD.     Grafts: LIMA to LAD patent; SVG to OM1 showed severe disease at the anastomosis with LCx/OM; SVG to RCA is known occluded.     INTERVENTION  IMPLANTS: 3.5 X 24 Synergy RX ostial LCx.     SPECIMEN REMOVED: none      POST-OP DIAGNOSIS  Significant triple vessel disease  Significant distal SVG stenosis-amenable to intervention   Significant ostial LCx disease s/p PCI        PLAN OF CARE    [x] Admit for observation, IVF with NS @ 75 cc/h for 6 h, monitor BUN/Cr.   [x]  Continue DAPT, B-blocker & Statin therapy. Keep Eliquis on hold. PRE-OP DIAGNOSIS: Abnormal stress test     PROCEDURE:[  ] LHC                         [ x ] Coronary angiography                         [  ] [facility]  Physician: Dr Roth  Assistant: Sandra    ANESTHESIA TYPE:  [  ]General Anesthesia  [ x ] Sedation  [ x ] Local/Regional    ESTIMATED BLOOD LOSS:    10   mL    CONDITION  [  ] Critical  [  ] Serious  [ x ]Fair  [  ]Good    IV CONTRAST:      150       mL    FINDINGS  Left Heart Catheterization:  LVEF%: 45% by echo      ACCESS:    [x ] left radial artery  [ ] right femoral artery    LEFT HEART CATHETERIZATION                                    Left main: severe disease  LAD: 100% occlusion at the ostium. Distal vessel supplied by a patent LIMA graft. L to R collaterals   Diag: luminal irregularities  Left Circumflex: 90% ostial stenosis, moderate disease distally.   OM: 95% stenosis at SVG anastomosis. Distal vessel showing luminal irregularities.   Right Coronary Artery: not injected known occluded   RPDA: supplied by collaterals from the distal LAD.     Grafts: LIMA to LAD patent; SVG to OM1 showed severe disease at the anastomosis with LCx/OM; SVG to RCA is known occluded.     INTERVENTION  IMPLANTS: 3.5 X 24 Synergy RX ostial LCx.     SPECIMEN REMOVED: none      POST-OP DIAGNOSIS  Significant triple vessel disease  Significant distal SVG stenosis-amenable to intervention   Significant ostial LCx disease s/p PCI        PLAN OF CARE    [x] Admit for observation, IVF with NS @ 75 cc/h for 6 h, monitor BUN/Cr.   [x]  Continue DAPT, B-blocker & Statin therapy. Keep Eliquis on hold.    agree

## 2020-09-15 NOTE — PROGRESS NOTE ADULT - ASSESSMENT
55 y/old F PMHx of CAD s/p CABG 1993 3V, Hypercholesterolemia, HTN, Hypothyroid, A-Fib (on eliquis), Morbidly obese, Paraumbilical Hernia, s/p Right sided nephrectomy due to CA 2019 was sent to the hospital for LHC by Dr Roth due to episodic SOB and CP X 1 month.    # Typical chest pain likely progression of stable Angina  - Hx of CAD s/p CABG  - s/p LHC 9/15 Grafts: LIMA to LAD patent; SVG to OM1 showed severe disease at the anastomosis with LCx/OM; SVG to RCA is known occluded s/p PCI to ostial LCx.   - keep NPO after midnight for possible PCI of distal SVG stenosis  - c/wIVF with NS @ 75 cc/h for 6 h, monitor BUN/Cr.   - Continue DAPT, B-blocker & Statin therapy. Keep Eliquis on hold for cath in am  - f/u lipid profile, HbA1c and 2 D echo    # HLD  - c/w statins    # HTN  - c/w metoprolol 25mg BID    # Hypthyroidism  - c/w synthroid 25mcg    # A Fib  - c/w metoprolol 25mg BID  - Hold eliquis for now    DVT PPx: Lovenox 40 mg s/c (hold in am for cath)  GI PPX: not indicated  Diet: DASH   Activity: Increase as tolerated  Dispo: from home, no needs  Code Status: full code

## 2020-09-15 NOTE — ASU PATIENT PROFILE, ADULT - PMH
Afib  since jan 2019  CAD (coronary artery disease) of bypass graft  at age 25  Heart disease  TRIPLE BYPASS  High cholesterol    Hypothyroid    Obesity

## 2020-09-15 NOTE — PROGRESS NOTE ADULT - SUBJECTIVE AND OBJECTIVE BOX
RENZO ORELLANA 55y Female  MRN#: [id number]   CODE STATUS: FULL     HPI  55 y/old F PMHx of CAD s/p CABG 1993 3V, Hypercholesterolemia, HTN, Hypothyroid, A-Fib (on eliquis), Morbidly obese, Paraumbilical Hernia, s/p Right sided nephrectomy due to CA 2019 was sent to the hospital for LHC by Dr Roth due to episodic SOB and CP X 1 month. Per pt she started to get sharp substernal chest pain on exertion with associated SOB mostly on exertion, specially after walking upstairs and relieved with rest, radiating to the back. Pt denied any hx of fever, chills, nausea, vomiting, diarrhea, constipation, melena, pain in abdomen, cough, increased urinary frequency, dysuria, headache, any changes in weight, recent travel, lightheadedness, dizziness, vertigo, localized weakness or numbness/tingling.     Pt had LHC on 9/15/20 Significant triple vessel disease, significant distal SVG stenosis-amenable to intervention and significant ostial LCx disease s/p PCI. Pt was admitted to medicine for observation and possible repeat intervention in am.       OBJECTIVE  PAST MEDICAL & SURGICAL HISTORY  Afib  since jan 2019    Obesity    CAD (coronary artery disease) of bypass graft  at age 25    Hypothyroid    High cholesterol    Heart disease  TRIPLE BYPASS    CAD (coronary artery disease) of bypass graft  1995      ALLERGIES:  No Known Allergies    MEDICATIONS:  STANDING MEDICATIONS  atorvastatin 80 milliGRAM(s) Oral at bedtime  influenza   Vaccine 0.5 milliLiter(s) IntraMuscular once  metoprolol tartrate 25 milliGRAM(s) Oral two times a day  sodium chloride 0.9%. 1000 milliLiter(s) IV Continuous <Continuous>    PRN MEDICATIONS      VITAL SIGNS: Last 24 Hours  T(C): --  T(F): --  HR: 92 (15 Sep 2020 07:25) (92 - 92)  BP: 124/84 (15 Sep 2020 07:25) (124/84 - 124/84)  BP(mean): 97 (15 Sep 2020 07:25) (97 - 97)  RR: 12 (15 Sep 2020 07:25) (12 - 12)  SpO2: 98% (15 Sep 2020 07:25) (98% - 98%)    LABS:                        14.3   8.68  )-----------( 347      ( 15 Sep 2020 07:00 )             46.3     09-15    139  |  101  |  17  ----------------------------<  138<H>  4.1   |  21  |  1.1    Ca    9.4      15 Sep 2020 07:00      PHYSICAL EXAM:    GENERAL: NAD, Obese, AAOx3  HEENT:  Atraumatic, Normocephalic. EOMI, PERRLA, conjunctiva and sclera clear, No JVD  PULMONARY: Clear to auscultation bilaterally; No wheeze  CARDIOVASCULAR: irregularly irregular rate and rhythm; No murmurs, rubs, or gallops  GASTROINTESTINAL: Soft, Nontender, distended; Bowel sounds present  MUSCULOSKELETAL:  2+ Peripheral Pulses, No clubbing, cyanosis, or edema  NEUROLOGY: non-focal  SKIN: No rashes or lesions

## 2020-09-15 NOTE — PATIENT PROFILE ADULT - NSPROEDAABILITYLEARN_GEN_A_NUR

## 2020-09-16 LAB
A1C WITH ESTIMATED AVERAGE GLUCOSE RESULT: 7 % — HIGH (ref 4–5.6)
ALBUMIN SERPL ELPH-MCNC: 3.5 G/DL — SIGNIFICANT CHANGE UP (ref 3.5–5.2)
ALP SERPL-CCNC: 66 U/L — SIGNIFICANT CHANGE UP (ref 30–115)
ALT FLD-CCNC: 9 U/L — SIGNIFICANT CHANGE UP (ref 0–41)
ANION GAP SERPL CALC-SCNC: 13 MMOL/L — SIGNIFICANT CHANGE UP (ref 7–14)
AST SERPL-CCNC: 14 U/L — SIGNIFICANT CHANGE UP (ref 0–41)
BASOPHILS # BLD AUTO: 0.03 K/UL — SIGNIFICANT CHANGE UP (ref 0–0.2)
BASOPHILS NFR BLD AUTO: 0.4 % — SIGNIFICANT CHANGE UP (ref 0–1)
BILIRUB SERPL-MCNC: 0.8 MG/DL — SIGNIFICANT CHANGE UP (ref 0.2–1.2)
BUN SERPL-MCNC: 15 MG/DL — SIGNIFICANT CHANGE UP (ref 10–20)
CALCIUM SERPL-MCNC: 8.9 MG/DL — SIGNIFICANT CHANGE UP (ref 8.5–10.1)
CHLORIDE SERPL-SCNC: 104 MMOL/L — SIGNIFICANT CHANGE UP (ref 98–110)
CHOLEST SERPL-MCNC: 381 MG/DL — HIGH (ref 100–200)
CO2 SERPL-SCNC: 22 MMOL/L — SIGNIFICANT CHANGE UP (ref 17–32)
CREAT SERPL-MCNC: 0.9 MG/DL — SIGNIFICANT CHANGE UP (ref 0.7–1.5)
EOSINOPHIL # BLD AUTO: 0.22 K/UL — SIGNIFICANT CHANGE UP (ref 0–0.7)
EOSINOPHIL NFR BLD AUTO: 3.3 % — SIGNIFICANT CHANGE UP (ref 0–8)
ESTIMATED AVERAGE GLUCOSE: 154 MG/DL — HIGH (ref 68–114)
GLUCOSE SERPL-MCNC: 115 MG/DL — HIGH (ref 70–99)
HCT VFR BLD CALC: 41.4 % — SIGNIFICANT CHANGE UP (ref 37–47)
HDLC SERPL-MCNC: 37 MG/DL — LOW
HGB BLD-MCNC: 12.8 G/DL — SIGNIFICANT CHANGE UP (ref 12–16)
IMM GRANULOCYTES NFR BLD AUTO: 0.3 % — SIGNIFICANT CHANGE UP (ref 0.1–0.3)
LIPID PNL WITH DIRECT LDL SERPL: 322 MG/DL — HIGH (ref 4–129)
LYMPHOCYTES # BLD AUTO: 1.46 K/UL — SIGNIFICANT CHANGE UP (ref 1.2–3.4)
LYMPHOCYTES # BLD AUTO: 21.8 % — SIGNIFICANT CHANGE UP (ref 20.5–51.1)
MAGNESIUM SERPL-MCNC: 1.8 MG/DL — SIGNIFICANT CHANGE UP (ref 1.8–2.4)
MCHC RBC-ENTMCNC: 25 PG — LOW (ref 27–31)
MCHC RBC-ENTMCNC: 30.9 G/DL — LOW (ref 32–37)
MCV RBC AUTO: 80.7 FL — LOW (ref 81–99)
MONOCYTES # BLD AUTO: 0.54 K/UL — SIGNIFICANT CHANGE UP (ref 0.1–0.6)
MONOCYTES NFR BLD AUTO: 8 % — SIGNIFICANT CHANGE UP (ref 1.7–9.3)
NEUTROPHILS # BLD AUTO: 4.44 K/UL — SIGNIFICANT CHANGE UP (ref 1.4–6.5)
NEUTROPHILS NFR BLD AUTO: 66.2 % — SIGNIFICANT CHANGE UP (ref 42.2–75.2)
NRBC # BLD: 0 /100 WBCS — SIGNIFICANT CHANGE UP (ref 0–0)
PLATELET # BLD AUTO: 280 K/UL — SIGNIFICANT CHANGE UP (ref 130–400)
POTASSIUM SERPL-MCNC: 4.3 MMOL/L — SIGNIFICANT CHANGE UP (ref 3.5–5)
POTASSIUM SERPL-SCNC: 4.3 MMOL/L — SIGNIFICANT CHANGE UP (ref 3.5–5)
PROT SERPL-MCNC: 6.5 G/DL — SIGNIFICANT CHANGE UP (ref 6–8)
RBC # BLD: 5.13 M/UL — SIGNIFICANT CHANGE UP (ref 4.2–5.4)
RBC # FLD: 15.4 % — HIGH (ref 11.5–14.5)
SARS-COV-2 RNA SPEC QL NAA+PROBE: SIGNIFICANT CHANGE UP
SODIUM SERPL-SCNC: 139 MMOL/L — SIGNIFICANT CHANGE UP (ref 135–146)
TOTAL CHOLESTEROL/HDL RATIO MEASUREMENT: 10.3 RATIO — HIGH (ref 4–5.5)
TRIGL SERPL-MCNC: 174 MG/DL — HIGH (ref 10–149)
WBC # BLD: 6.71 K/UL — SIGNIFICANT CHANGE UP (ref 4.8–10.8)
WBC # FLD AUTO: 6.71 K/UL — SIGNIFICANT CHANGE UP (ref 4.8–10.8)

## 2020-09-16 PROCEDURE — 93010 ELECTROCARDIOGRAM REPORT: CPT

## 2020-09-16 PROCEDURE — 93306 TTE W/DOPPLER COMPLETE: CPT | Mod: 26

## 2020-09-16 RX ORDER — SODIUM CHLORIDE 9 MG/ML
1000 INJECTION INTRAMUSCULAR; INTRAVENOUS; SUBCUTANEOUS
Refills: 0 | Status: DISCONTINUED | OUTPATIENT
Start: 2020-09-16 | End: 2020-09-17

## 2020-09-16 RX ORDER — METOPROLOL TARTRATE 50 MG
25 TABLET ORAL ONCE
Refills: 0 | Status: COMPLETED | OUTPATIENT
Start: 2020-09-16 | End: 2020-09-16

## 2020-09-16 RX ADMIN — Medication 25 MILLIGRAM(S): at 21:55

## 2020-09-16 RX ADMIN — SODIUM CHLORIDE 100 MILLILITER(S): 9 INJECTION INTRAMUSCULAR; INTRAVENOUS; SUBCUTANEOUS at 21:10

## 2020-09-16 RX ADMIN — Medication 25 MICROGRAM(S): at 05:10

## 2020-09-16 RX ADMIN — SODIUM CHLORIDE 75 MILLILITER(S): 9 INJECTION INTRAMUSCULAR; INTRAVENOUS; SUBCUTANEOUS at 15:56

## 2020-09-16 NOTE — PROGRESS NOTE ADULT - SUBJECTIVE AND OBJECTIVE BOX
SUBJECTIVE:    Patient is a 55y old Female who presents with a chief complaint of Chest pain (15 Sep 2020 16:57)    Currently admitted to medicine with the primary diagnosis of    Today is hospital day 1d. This morning she is resting comfortably in bed and reports no new issues or overnight events.     INTERVAL EVENTS:   no acute events overnight    PAST MEDICAL & SURGICAL HISTORY  Afib  since jan 2019    Obesity    CAD (coronary artery disease) of bypass graft  at age 25    Hypothyroid    High cholesterol    Heart disease  TRIPLE BYPASS    CAD (coronary artery disease) of bypass graft  1995        ALLERGIES:  No Known Allergies    MEDICATIONS:  STANDING MEDICATIONS  aspirin  chewable 81 milliGRAM(s) Chew daily  atorvastatin 80 milliGRAM(s) Oral at bedtime  clopidogrel Tablet 75 milliGRAM(s) Oral daily  influenza   Vaccine 0.5 milliLiter(s) IntraMuscular once  levothyroxine 25 MICROGram(s) Oral daily  metoprolol tartrate 25 milliGRAM(s) Oral two times a day  sodium chloride 0.9%. 1000 milliLiter(s) IV Continuous <Continuous>    PRN MEDICATIONS    VITALS:   T(F): 96.5  HR: 84  BP: 127/71  RR: 18  SpO2: 97%    LABS:                        12.8   6.71  )-----------( 280      ( 16 Sep 2020 04:48 )             41.4     09-16    139  |  104  |  15  ----------------------------<  115<H>  4.3   |  22  |  0.9    Ca    8.9      16 Sep 2020 04:48  Mg     1.8     09-16    TPro  6.5  /  Alb  3.5  /  TBili  0.8  /  DBili  x   /  AST  14  /  ALT  9   /  AlkPhos  66  09-16                  RADIOLOGY:    PHYSICAL EXAM:  GEN: No acute distress  PULM/CHEST: Clear to auscultation bilaterally, no rales, rhonchi or wheezes   CVS: irregularly irregular rate and rhythm, S1-S2, no murmurs  ABD: Soft, non-tender, non-distended, +BS  EXT: No edema  NEURO: AAOx3    Bernal Catheter:

## 2020-09-16 NOTE — PROGRESS NOTE ADULT - SUBJECTIVE AND OBJECTIVE BOX
Cardiology Follow up    RENZO ORELLANA   55y Female  PAST MEDICAL & SURGICAL HISTORY:  Afib  since 2019    Obesity    CAD (coronary artery disease) of bypass graft  at age 25    Hypothyroid    High cholesterol    Heart disease  TRIPLE BYPASS    CAD (coronary artery disease) of bypass graft           HPI:  55 y/old F here for LHC due to SOB and CP that radiates to her back, CP started 1.5 month ago and gets worse with activity   PMH: ASHD, Hypercholesterolemia, HTN, Hypothyroid A-Fib, Morbidly obese, Hernia  PSH: CABG  3V, Right Kidney removed due to Ca 2019  Last dose of Eliquis 2020 (15 Sep 2020 07:25)    Allergies    No Known Allergies    Intolerances      Patient without complaints. Pt ambulated without issues/symptoms  Denies CP, SOB, palpitations, or dizziness  No events on telemetry overnight    Vital Signs Last 24 Hrs  T(C): 35.8 (16 Sep 2020 05:00), Max: 37 (15 Sep 2020 21:35)  T(F): 96.5 (16 Sep 2020 05:00), Max: 98.6 (15 Sep 2020 21:35)  HR: 84 (16 Sep 2020 05:00) (83 - 87)  BP: 127/71 (16 Sep 2020 05:00) (127/71 - 137/77)  BP(mean): --  RR: 18 (16 Sep 2020 05:00) (18 - 18)  SpO2: 97% (16 Sep 2020 08:20) (97% - 97%)    MEDICATIONS  (STANDING):  aspirin  chewable 81 milliGRAM(s) Chew daily  atorvastatin 80 milliGRAM(s) Oral at bedtime  clopidogrel Tablet 75 milliGRAM(s) Oral daily  influenza   Vaccine 0.5 milliLiter(s) IntraMuscular once  levothyroxine 25 MICROGram(s) Oral daily  metoprolol tartrate 25 milliGRAM(s) Oral two times a day  sodium chloride 0.9%. 1000 milliLiter(s) (75 mL/Hr) IV Continuous <Continuous>  sodium chloride 0.9%. 1000 milliLiter(s) (75 mL/Hr) IV Continuous <Continuous>          REVIEW OF SYSTEMS:          All negative except as mentioned in HPI    PHYSICAL EXAM:           CONSTITUTIONAL: Well-developed; well-nourished; in no acute distress  	SKIN: warm, dry  	HEAD: Normocephalic; atraumatic  	EYES: PERRL.  	ENT: No nasal discharge, airway clear, mucous membranes moist  	NECK: Supple; non tender.  	CARD: +S1, +S2, no murmurs, gallops, or rubs. Regular rate and rhythm    	RESP: No wheezes, rales or rhonchi. CTA B/L  	ABD: soft ntnd, + BS x 4 quadrants  	EXT: moves all extremities,  no clubbing, cyanosis or edema  	NEURO: Alert and oriented x3, no focal deficits          PSYCH: Cooperative, appropriate          VASCULAR:  +2 Rad / +2 PTs / + 2 DPs          EXTREMITY:               	   Left Radial: Dressing D/C/I, pressure dressing removed, access site soft, no hematoma, no pain, + pulses, no sign of infection, no numbness            EC Lead ECG (20 @ 07:24)     Diagnosis Line Atrial fibrillation  Low voltage QRS  Cannot rule out Anterior infarct , age undetermined  ST & T wave abnormality, consider lateral ischemia  Abnormal ECG    Confirmed by Filippo Haley ([id number]) on 2020 9:14:58 AM                                                     2D ECHO:  Results pending (2020)      LABS:                        12.8   6.71  )-----------( 280      ( 16 Sep 2020 04:48 )             41.4         139  |  104  |  15  ----------------------------<  115<H>  4.3   |  22  |  0.9    Ca    8.9      16 Sep 2020 04:48  Mg     1.8         TPro  6.5  /  Alb  3.5  /  TBili  0.8  /  DBili  x   /  AST  14  /  ALT  9   /  AlkPhos  66          Magnesium, Serum: 1.8 mg/dL [1.8 - 2.4] (20 @ 04:48)  LIVER FUNCTIONS - ( 16 Sep 2020 04:48 )  Alb: 3.5 g/dL / Pro: 6.5 g/dL / ALK PHOS: 66 U/L / ALT: 9 U/L / AST: 14 U/L / GGT: x             A/P:  I discussed the case with Cardiologist Dr. Roth  and recommend the following:    S/P PCI:    INTERVENTION  IMPLANTS: 3.5 X 24 Synergy RX ostial LCx.   POST-OP DIAGNOSIS  Significant triple vessel disease  Significant distal SVG stenosis-amenable to intervention   Significant ostial LCx disease s/p PCI      	         Continue DAPT ( Aspirin 81 mg PO Daily and   Plavix 75 mg po daily ),  B-Blocker, Statin Therapy                   Patient given 30 day supply of ( Aspirin 81 mg daily and Plavix 75 mg daily ) to take at home                   Staged [facility] today                    NS @ 75 ml/hr 1 hour prior to cath 2020                    Monitor access site                   Patient agreeing to take DAPT for at least one year or as directed by cardiologist                    Pt given instructions on importance of taking antiplatelet medication or risk acute stent thrombosis/death                   Post cath instructions, access site care and activity restrictions reviewed with patient                     Discussed with patient to return to hospital if experience chest pain, shortness breath, dizziness and site bleeding                   Aggressive risk factor modification, diet counseling, smoking cessation discussed with patient                       Will f/u after [facility]

## 2020-09-16 NOTE — CHART NOTE - NSCHARTNOTEFT_GEN_A_CORE
PREOPERATIVE DAY OF PROCEDURE EVALUATION:  I have personally seen and examined the patient.  I agree with the history and physical which I have reviewed and noted any changes below.  (Signed electronically by Ira)  09-16-20 @ 19:03

## 2020-09-16 NOTE — PROGRESS NOTE ADULT - ASSESSMENT
55 y/old F PMHx of CAD s/p CABG 1993 3V, Hypercholesterolemia, HTN, Hypothyroid, A-Fib (on eliquis), Morbidly obese, Paraumbilical Hernia, s/p Right sided nephrectomy due to CA 2019 was sent to the hospital for LHC by Dr Roth due to episodic SOB and CP X 1 month.    # unstable Angina  - Hx of CAD s/p CABG  - s/p LHC 9/15 Grafts: LIMA to LAD patent; SVG to OM1 showed severe disease at the anastomosis with LCx/OM; SVG to RCA is known occluded s/p PCI to ostial LCx.   - keep NPO after midnight for possible PCI of distal SVG stenosis  - c/wIVF with NS @ 75 cc/h for 6 h, monitor BUN/Cr.   - Continue DAPT, B-blocker & Statin therapy. Keep Eliquis on hold for cath in am  - f/u lipid profile, HbA1c and 2 D echo    # HLD  - c/w statins    # HTN  - c/w metoprolol 25mg BID    # Hypthyroidism  - c/w synthroid 25mcg    # A Fib  - c/w metoprolol 25mg BID  - Hold eliquis for now    DVT PPx: Lovenox 40 mg s/c (hold in am for cath)  GI PPX: not indicated  Diet: DASH   Activity: Increase as tolerated  Dispo: from home, no needs  Code Status: full code

## 2020-09-16 NOTE — CHART NOTE - NSCHARTNOTEFT_GEN_A_CORE
PRE-OP DIAGNOSIS: staged PCI to SVG into OM    PROCEDURE:[  ] C                         [ x ] Coronary angiography/SVG                         [  ] [facility]  Physician: Dr Roth  Assistant: Bijan    ANESTHESIA TYPE:  [  ]General Anesthesia  [ x ] Sedation  [  ] Local/Regional    ESTIMATED BLOOD LOSS:    10   mL    CONDITION  [  ] Critical  [  ] Serious  [  ]Fair  [ x ]Good    IV CONTRAST:   150     mL    FINDINGS  Left Heart Catheterization:  LVEF%:  LVEDP:  [ ] Normal Coronary Arteries  [ ] Luminal Irregularities  [ ] Non-obstructive CAD    ACCESS:    [x ] right radial artery  [ ] right femoral artery    LEFT HEART CATHETERIZATION                                    SVG : 95 %lesion in distal anastomosis into the OM    INTERVENTION  IMPLANTS: 1 JOSE C    SPECIMEN REMOVED: n/a        POST-OP DIAGNOSIS  staged PCI to SVG to OM with 1 DS        PLAN OF CARE  [ ] D/C Home today  [ ]  D/C in AM  [x ] Return to In-patient bed  [ ] Admit for observation  [ ] Return for staged procedure:  [ ] CT Surgery consult called  [ x]  Continue DAPT, B-blocker & Statin therapy PRE-OP DIAGNOSIS: staged PCI to SVG into OM    PROCEDURE:[  ] C                         [ x ] Coronary angiography/SVG                         [  ] [facility]  Physician: Dr Roth  Assistant: Bijan    ANESTHESIA TYPE:  [  ]General Anesthesia  [ x ] Sedation  [  ] Local/Regional    ESTIMATED BLOOD LOSS:    10   mL    CONDITION  [  ] Critical  [  ] Serious  [  ]Fair  [ x ]Good    IV CONTRAST:   150     mL    FINDINGS  Left Heart Catheterization:  LVEF%:  LVEDP:  [ ] Normal Coronary Arteries  [ ] Luminal Irregularities  [ ] Non-obstructive CAD    ACCESS:    [x ] right radial artery  [ ] right femoral artery    LEFT HEART CATHETERIZATION                                    SVG : 95 %lesion in distal anastomosis into the OM    INTERVENTION  IMPLANTS: 1 JOSE C    SPECIMEN REMOVED: n/a        POST-OP DIAGNOSIS  staged PCI to SVG to OM with 1 DS        PLAN OF CARE  [ ] D/C Home today  [ ]  D/C in AM  [x ] Return to In-patient bed  [ ] Admit for observation  [ ] Return for staged procedure:  [ ] CT Surgery consult called  [ x]  Continue DAPT, B-blocker & Statin therapy    agree

## 2020-09-17 ENCOUNTER — TRANSCRIPTION ENCOUNTER (OUTPATIENT)
Age: 55
End: 2020-09-17

## 2020-09-17 VITALS
RESPIRATION RATE: 18 BRPM | TEMPERATURE: 95 F | DIASTOLIC BLOOD PRESSURE: 76 MMHG | SYSTOLIC BLOOD PRESSURE: 120 MMHG | HEART RATE: 72 BPM

## 2020-09-17 LAB
ANION GAP SERPL CALC-SCNC: 11 MMOL/L — SIGNIFICANT CHANGE UP (ref 7–14)
BUN SERPL-MCNC: 15 MG/DL — SIGNIFICANT CHANGE UP (ref 10–20)
CALCIUM SERPL-MCNC: 8.6 MG/DL — SIGNIFICANT CHANGE UP (ref 8.5–10.1)
CHLORIDE SERPL-SCNC: 105 MMOL/L — SIGNIFICANT CHANGE UP (ref 98–110)
CO2 SERPL-SCNC: 25 MMOL/L — SIGNIFICANT CHANGE UP (ref 17–32)
CREAT SERPL-MCNC: 0.9 MG/DL — SIGNIFICANT CHANGE UP (ref 0.7–1.5)
GLUCOSE SERPL-MCNC: 165 MG/DL — HIGH (ref 70–99)
HCT VFR BLD CALC: 39.7 % — SIGNIFICANT CHANGE UP (ref 37–47)
HGB BLD-MCNC: 12.1 G/DL — SIGNIFICANT CHANGE UP (ref 12–16)
MCHC RBC-ENTMCNC: 24.7 PG — LOW (ref 27–31)
MCHC RBC-ENTMCNC: 30.5 G/DL — LOW (ref 32–37)
MCV RBC AUTO: 81 FL — SIGNIFICANT CHANGE UP (ref 81–99)
NRBC # BLD: 0 /100 WBCS — SIGNIFICANT CHANGE UP (ref 0–0)
PLATELET # BLD AUTO: 273 K/UL — SIGNIFICANT CHANGE UP (ref 130–400)
POTASSIUM SERPL-MCNC: 3.8 MMOL/L — SIGNIFICANT CHANGE UP (ref 3.5–5)
POTASSIUM SERPL-SCNC: 3.8 MMOL/L — SIGNIFICANT CHANGE UP (ref 3.5–5)
RBC # BLD: 4.9 M/UL — SIGNIFICANT CHANGE UP (ref 4.2–5.4)
RBC # FLD: 15.3 % — HIGH (ref 11.5–14.5)
SODIUM SERPL-SCNC: 141 MMOL/L — SIGNIFICANT CHANGE UP (ref 135–146)
WBC # BLD: 6.95 K/UL — SIGNIFICANT CHANGE UP (ref 4.8–10.8)
WBC # FLD AUTO: 6.95 K/UL — SIGNIFICANT CHANGE UP (ref 4.8–10.8)

## 2020-09-17 PROCEDURE — 99233 SBSQ HOSP IP/OBS HIGH 50: CPT

## 2020-09-17 PROCEDURE — 93010 ELECTROCARDIOGRAM REPORT: CPT

## 2020-09-17 RX ORDER — APIXABAN 2.5 MG/1
1 TABLET, FILM COATED ORAL
Qty: 60 | Refills: 0
Start: 2020-09-17 | End: 2020-10-16

## 2020-09-17 RX ORDER — SIMVASTATIN 20 MG/1
0 TABLET, FILM COATED ORAL
Qty: 30 | Refills: 0 | DISCHARGE

## 2020-09-17 RX ORDER — ATORVASTATIN CALCIUM 80 MG/1
1 TABLET, FILM COATED ORAL
Qty: 0 | Refills: 0 | DISCHARGE
Start: 2020-09-17

## 2020-09-17 RX ORDER — ASPIRIN/CALCIUM CARB/MAGNESIUM 324 MG
1 TABLET ORAL
Qty: 0 | Refills: 0 | DISCHARGE
Start: 2020-09-17

## 2020-09-17 RX ORDER — CLOPIDOGREL BISULFATE 75 MG/1
1 TABLET, FILM COATED ORAL
Qty: 0 | Refills: 0 | DISCHARGE
Start: 2020-09-17

## 2020-09-17 RX ADMIN — Medication 25 MICROGRAM(S): at 05:21

## 2020-09-17 NOTE — DISCHARGE NOTE PROVIDER - HOSPITAL COURSE
55 y/old F PMHx of CAD s/p CABG 1993 3V, Hypercholesterolemia, HTN, Hypothyroid, A-Fib (on eliquis), Morbidly obese, Paraumbilical Hernia, s/p Right sided nephrectomy due to CA 2019 was sent to the hospital for LHC by Dr Roth due to episodic SOB and CP X 1 month. Per pt she started to get sharp substernal chest pain on exertion with associated SOB mostly on exertion, specially after walking upstairs and relieved with rest, radiating to the back. Pt denied any hx of fever, chills, nausea, vomiting, diarrhea, constipation, melena, pain in abdomen, cough, increased urinary frequency, dysuria, headache, any changes in weight, recent travel, lightheadedness, dizziness, vertigo, localized weakness or numbness/tingling.     Pt had LHC on 9/15/20 Significant triple vessel disease, significant distal SVG stenosis-amenable to intervention and significant ostial LCx disease s/p PCI. Pt underwent repeat LHC on 9/16, found ohave have 95% lesion in distal anastomosis into the OM s/p 1 JOSE C. on 9/17, patient is clinically stable, tolerating diet, discharged home.

## 2020-09-17 NOTE — PROGRESS NOTE ADULT - SUBJECTIVE AND OBJECTIVE BOX
Cardiology Follow up    RENZO ORELLANA   55y Female  PAST MEDICAL & SURGICAL HISTORY:  Afib  since 2019    Obesity    CAD (coronary artery disease) of bypass graft  at age 25    Hypothyroid    High cholesterol    Heart disease  TRIPLE BYPASS    CAD (coronary artery disease) of bypass graft           HPI:  55 y/old F here for LHC due to SOB and CP that radiates to her back, CP started 1.5 month ago and gets worse with activity   PMH: ASHD, Hypercholesterolemia, HTN, Hypothyroid A-Fib, Morbidly obese, Hernia  PSH: CABG  3V, Right Kidney removed due to Ca 2019  Last dose of Eliquis 2020 (15 Sep 2020 07:25)    Allergies    No Known Allergies    Intolerances      Patient without complaints. Pt ambulated without issues/symptoms  Denies CP, SOB, palpitations, or dizziness  No events on telemetry overnight    Vital Signs Last 24 Hrs  T(C): 35.8 (17 Sep 2020 05:00), Max: 36.4 (16 Sep 2020 21:18)  T(F): 96.5 (17 Sep 2020 05:00), Max: 97.6 (16 Sep 2020 21:18)  HR: 80 (17 Sep 2020 05:00) (80 - 81)  BP: 121/68 (17 Sep 2020 05:00) (121/68 - 136/74)  BP(mean): 3 (16 Sep 2020 21:18) (3 - 3)  RR: 18 (17 Sep 2020 05:00) (18 - 18)  SpO2: 98% (16 Sep 2020 21:20) (98% - 98%)    MEDICATIONS  (STANDING):  aspirin  chewable 81 milliGRAM(s) Chew daily  atorvastatin 80 milliGRAM(s) Oral at bedtime  clopidogrel Tablet 75 milliGRAM(s) Oral daily  influenza   Vaccine 0.5 milliLiter(s) IntraMuscular once  levothyroxine 25 MICROGram(s) Oral daily  metoprolol tartrate 25 milliGRAM(s) Oral two times a day          REVIEW OF SYSTEMS:          All negative except as mentioned in HPI    PHYSICAL EXAM:           CONSTITUTIONAL: Well-developed; well-nourished; in no acute distress  	SKIN: warm, dry  	HEAD: Normocephalic; atraumatic  	EYES: PERRL.  	ENT: No nasal discharge, airway clear, mucous membranes moist  	NECK: Supple; non tender.  	CARD: +S1, +S2, no murmurs, gallops, or rubs. Irregular rate and irregular rhythm    	RESP: No wheezes, rales or rhonchi. CTA B/L  	ABD: soft ntnd, + BS x 4 quadrants  	EXT: moves all extremities,  no clubbing, cyanosis or edema  	NEURO: Alert and oriented x3, no focal deficits          PSYCH: Cooperative, appropriate          VASCULAR:  +2 Rad / +2 PTs / + 2 DPs          EXTREMITY:           	   Right Radial: Dressing D/C/I, pressure dressing removed, access site soft, no hematoma, no pain, + pulses, no sign of infection, no numbness                 Left Radial :  dressing removed, access site soft, no hematoma, no pain, + pulses, no sign of infection, no numbness    EC Lead ECG (20 @ 07:24)       Diagnosis Line Atrial fibrillation  Low voltage QRS  Cannot rule out Anterior infarct , age undetermined  ST & T wave abnormality, consider lateral ischemia  Abnormal ECG    Confirmed by Filippo Haley ([id number]) on 2020 9:14:58 AM                                                                                                                  2D ECHO:   OBSERVATION (TTE Echo Complete w/o Contrast w/ Doppler) (20 @ 14:32)   CardExmStatus_ExamStatus Exam Completed              LABS:                        12.1   6.95  )-----------( 273      ( 17 Sep 2020 00:39 )             39.7         141  |  105  |  15  ----------------------------<  165<H>  3.8   |  25  |  0.9    Ca    8.6      17 Sep 2020 00:39  Mg     1.8         TPro  6.5  /  Alb  3.5  /  TBili  0.8  /  DBili  x   /  AST  14  /  ALT  9   /  AlkPhos  66          LIVER FUNCTIONS - ( 16 Sep 2020 04:48 )  Alb: 3.5 g/dL / Pro: 6.5 g/dL / ALK PHOS: 66 U/L / ALT: 9 U/L / AST: 14 U/L / GGT: x             A/P:  I discussed the case with Cardiologist Dr. Roth  and recommend the following:    S/P PCI:      9/15/2020  INTERVENTION  IMPLANTS: 3.5 X 24 Synergy RX ostial LCx.   POST-OP DIAGNOSIS  Significant triple vessel disease  Significant distal SVG stenosis-amenable to intervention   Significant ostial LCx disease s/p PCI            2020  INTERVENTION  IMPLANTS: 1 JOSE C  POST-OP DIAGNOSIS  staged PCI to SVG to OM with 1 DS        	         Continue TAPT ( Aspirin 81 mg PO Daily  Eliquis 5mg po twice a day and Plavix 75mg po daily for 30 days. Then D/C ASA 81mg po  ),  B-Blocker, Statin Therapy                   Patient given 30 day supply of ( Aspirin 81 mg daily and Plavix 75 mg daily ) to take at home                   Will eval for ACE/ ARB in office.                     Monitor access site                   Patient agreeing to take DAPT for at least one year or as directed by cardiologist                    Pt given instructions on importance of taking antiplatelet medication or risk acute stent thrombosis/death                   Post cath instructions, access site care and activity restrictions reviewed with patient                     Discussed with patient to return to hospital if experience chest pain, shortness breath, dizziness and site bleeding                   Aggressive risk factor modification, diet counseling, smoking cessation discussed with patient                       Follow up with Cardiology Dr. Roth   in one to two weeks.  Instructed to call and make an appointment                                       Cardiology Follow up    RENZO ORELLANA   55y Female  PAST MEDICAL & SURGICAL HISTORY:  Afib  since 2019    Obesity    CAD (coronary artery disease) of bypass graft  at age 25    Hypothyroid    High cholesterol    Heart disease  TRIPLE BYPASS    CAD (coronary artery disease) of bypass graft           HPI:  55 y/old F here for LHC due to SOB and CP that radiates to her back, CP started 1.5 month ago and gets worse with activity   PMH: ASHD, Hypercholesterolemia, HTN, Hypothyroid A-Fib, Morbidly obese, Hernia  PSH: CABG  3V, Right Kidney removed due to Ca 2019  Last dose of Eliquis 2020 (15 Sep 2020 07:25)    Allergies    No Known Allergies    Intolerances      Patient without complaints. Pt ambulated without issues/symptoms  Denies CP, SOB, palpitations, or dizziness  No events on telemetry overnight    Vital Signs Last 24 Hrs  T(C): 35.8 (17 Sep 2020 05:00), Max: 36.4 (16 Sep 2020 21:18)  T(F): 96.5 (17 Sep 2020 05:00), Max: 97.6 (16 Sep 2020 21:18)  HR: 80 (17 Sep 2020 05:00) (80 - 81)  BP: 121/68 (17 Sep 2020 05:00) (121/68 - 136/74)  BP(mean): 3 (16 Sep 2020 21:18) (3 - 3)  RR: 18 (17 Sep 2020 05:00) (18 - 18)  SpO2: 98% (16 Sep 2020 21:20) (98% - 98%)    MEDICATIONS  (STANDING):  aspirin  chewable 81 milliGRAM(s) Chew daily  atorvastatin 80 milliGRAM(s) Oral at bedtime  clopidogrel Tablet 75 milliGRAM(s) Oral daily  influenza   Vaccine 0.5 milliLiter(s) IntraMuscular once  levothyroxine 25 MICROGram(s) Oral daily  metoprolol tartrate 25 milliGRAM(s) Oral two times a day          REVIEW OF SYSTEMS:          All negative except as mentioned in HPI    PHYSICAL EXAM:           CONSTITUTIONAL: Well-developed; well-nourished; in no acute distress  	SKIN: warm, dry  	HEAD: Normocephalic; atraumatic  	EYES: PERRL.  	ENT: No nasal discharge, airway clear, mucous membranes moist  	NECK: Supple; non tender.  	CARD: +S1, +S2, no murmurs, gallops, or rubs. Irregular rate and irregular rhythm    	RESP: No wheezes, rales or rhonchi. CTA B/L  	ABD: soft ntnd, + BS x 4 quadrants  	EXT: moves all extremities,  no clubbing, cyanosis or edema  	NEURO: Alert and oriented x3, no focal deficits          PSYCH: Cooperative, appropriate          VASCULAR:  +2 Rad / +2 PTs / + 2 DPs          EXTREMITY:           	   Right Radial: Dressing D/C/I, pressure dressing removed, access site soft, no hematoma, no pain, + pulses, no sign of infection, no numbness                 Left Radial :  dressing removed, access site soft, no hematoma, no pain, + pulses, no sign of infection, no numbness    EC Lead ECG (20 @ 07:24)       Diagnosis Line Atrial fibrillation  Low voltage QRS  Cannot rule out Anterior infarct , age undetermined  ST & T wave abnormality, consider lateral ischemia  Abnormal ECG    Confirmed by Filippo Haley ([id number]) on 2020 9:14:58 AM                                                                                                                  2D ECHO:   OBSERVATION (TTE Echo Complete w/o Contrast w/ Doppler) (20 @ 14:32)   CardExmStatus_ExamStatus Exam Completed              LABS:                        12.1   6.95  )-----------( 273      ( 17 Sep 2020 00:39 )             39.7         141  |  105  |  15  ----------------------------<  165<H>  3.8   |  25  |  0.9    Ca    8.6      17 Sep 2020 00:39  Mg     1.8         TPro  6.5  /  Alb  3.5  /  TBili  0.8  /  DBili  x   /  AST  14  /  ALT  9   /  AlkPhos  66          LIVER FUNCTIONS - ( 16 Sep 2020 04:48 )  Alb: 3.5 g/dL / Pro: 6.5 g/dL / ALK PHOS: 66 U/L / ALT: 9 U/L / AST: 14 U/L / GGT: x             A/P:  I discussed the case with Cardiologist Dr. Roth  and recommend the following:    S/P PCI:      9/15/2020  INTERVENTION  IMPLANTS: 3.5 X 24 Synergy RX ostial LCx.   POST-OP DIAGNOSIS  Significant triple vessel disease  Significant distal SVG stenosis-amenable to intervention   Significant ostial LCx disease s/p PCI            2020  INTERVENTION  IMPLANTS: 1 JOSE C  POST-OP DIAGNOSIS  staged PCI to SVG to OM with 1 DS        	         Continue TAPT ( Aspirin 81 mg PO Daily  Eliquis 5mg po twice a day and Plavix 75mg po daily for 30 days. Then D/C ASA 81mg po  ),  B-Blocker, Statin Therapy                   Patient given 30 day supply of ( Aspirin 81 mg daily and Plavix 75 mg daily ) to take at home                   Will eval for ACE/ ARB in office. EF 45% pt is not diabetic                     Monitor access site                   Patient agreeing to take DAPT for at least one year or as directed by cardiologist                    Pt given instructions on importance of taking antiplatelet medication or risk acute stent thrombosis/death                   Post cath instructions, access site care and activity restrictions reviewed with patient                     Discussed with patient to return to hospital if experience chest pain, shortness breath, dizziness and site bleeding                   Aggressive risk factor modification, diet counseling, smoking cessation discussed with patient                       Follow up with Cardiology Dr. Roth   in one to two weeks.  Instructed to call and make an appointment

## 2020-09-17 NOTE — DISCHARGE NOTE PROVIDER - CARE PROVIDER_API CALL
Javier Roth)  Cardiovascular Disease; Interventional Cardiology  [address] [place] [address]  Phone: [phone]  Fax: [phone]  Follow Up Time: 1 week

## 2020-09-17 NOTE — DISCHARGE NOTE NURSING/CASE MANAGEMENT/SOCIAL WORK - PATIENT PORTAL LINK FT
You can access the FollowMyHealth Patient Portal offered by [facility] by registering at the following website: [email or web address]. By joining [employer]’s FollowMyHealth portal, you will also be able to view your health information using other applications (apps) compatible with our system.

## 2020-09-17 NOTE — PROGRESS NOTE ADULT - ASSESSMENT
A 55 years old female presented for elective cath for intermittent CP and SOB for the last 1.5 months      [place]  S/P cath and staged PCI SVG to OM  CAD S/P CABG  Morbid obesity  DL  HTN  A-Fib         PLAN:    ·	S/P staged PCI  ·	On ASA, Plavix, Metoprolol and Lipitor 80 mg po qhs  ·	Pt's LDL needs to be closely monitored as an out pt. If does not go down <100 will need PCS-K inhibitors  ·	Check ECHO    * Med rec reviewed. Plan of care D/W the pt. Time spent 40 minutes. A 55 years old female presented for elective cath for intermittent CP and SOB for the last 1.5 months      [place]  S/P cath and staged PCI SVG to OM  CAD S/P CABG  Morbid obesity  DL  HTN  A-Fib on Eliquis         PLAN:    ·	S/P staged PCI  ·	On ASA, Plavix, Metoprolol and Lipitor 80 mg po qhs  ·	Cont Eliquis  ·	Pt's LDL needs to be closely monitored as an out pt. If does not go down <100 will need PCS-K inhibitors  ·	Check ECHO    * Med rec reviewed. Plan of care D/W the pt. Time spent 40 minutes. A 55 years old female presented for elective cath for intermittent CP and SOB for the last 1.5 months      [place]  S/P cath and staged PCI SVG to OM  CAD S/P CABG  Morbid obesity  DL  HTN  A-Fib on Eliquis         PLAN:    ·	S/P staged PCI  ·	On ASA, Plavix, Metoprolol and Lipitor 80 mg po qhs  ·	Cont Eliquis  ·	Pt's LDL needs to be closely monitored as an out pt. If does not go down <100 will need PCSK9 inhibitors  ·	Check ECHO    * Med rec reviewed. Plan of care D/W the pt. Time spent 40 minutes.

## 2020-09-17 NOTE — DISCHARGE NOTE PROVIDER - NSDCCPCAREPLAN_GEN_ALL_CORE_FT
PRINCIPAL DISCHARGE DIAGNOSIS  Diagnosis: Unstable angina  Assessment and Plan of Treatment: Chest Pain  Chest pain can be caused by many different conditions which may or may not be dangerous. Causes include heartburn, lung infections, heart attack, blood clot in lungs, skin infections, strain or damage to muscle, cartilage, or bones, etc. In addition to a history and physical examination, an electrocardiogram (ECG) or other lab tests may have been performed to determine the cause of your chest pain. Follow up with your primary care provider or with a cardiologist as instructed.   SEEK IMMEDIATE MEDICAL CARE IF YOU HAVE ANY OF THE FOLLOWING SYMPTOMS: worsening chest pain, coughing up blood, unexplained back/neck/jaw pain, severe abdominal pain, dizziness or lightheadedness, fainting, shortness of breath, sweaty or clammy skin, vomiting, or racing heart beat. These symptoms may represent a serious problem that is an emergency. Do not wait to see if the symptoms will go away. Get medical help right away. Call 911 and do not drive yourself to the hospital.

## 2020-09-17 NOTE — DISCHARGE NOTE PROVIDER - NSDCMRMEDTOKEN_GEN_ALL_CORE_FT
apixaban 5 mg oral tablet: 1 tab(s) orally 2 times a day   aspirin 81 mg oral tablet, chewable: 1 tab(s) orally once a day  atorvastatin 80 mg oral tablet: 1 tab(s) orally once a day (at bedtime)  clopidogrel 75 mg oral tablet: 1 tab(s) orally once a day  levothyroxine 25 mcg (0.025 mg) oral tablet: 1 tab(s) orally once a day  metoprolol tartrate 25 mg oral tablet: 1 tab(s) orally 2 times a day

## 2020-09-17 NOTE — PROGRESS NOTE ADULT - SUBJECTIVE AND OBJECTIVE BOX
RENZO ORELLANA  55y Female    CHIEF COMPLAINT:    Patient is a 55y old  Female who presents with a chief complaint of LHC (16 Sep 2020 15:35)      INTERVAL HPI/OVERNIGHT EVENTS:    Patient seen and examined.    ROS: All other systems are negative.    Vital Signs:    T(F): 96.5 (20 @ 05:00), Max: 97.6 (20 @ 21:18)  HR: 80 (20 @ 05:00) (80 - 81)  BP: 121/68 (20 @ 05:00) (121/68 - 136/74)  RR: 18 (20 @ 05:00) (18 - 18)  SpO2: 98% (20 @ 21:20) (97% - 98%)  I&O's Summary    16 Sep 2020 07:01  -  17 Sep 2020 07:00  --------------------------------------------------------  IN: 1200 mL / OUT: 0 mL / NET: 1200 mL      Daily     Daily Weight in k.2 (17 Sep 2020 05:00)  CAPILLARY BLOOD GLUCOSE          PHYSICAL EXAM:    GENERAL:  NAD  SKIN: No rashes or lesions  HENT: Atrumatic. Normocephalic. PERRL. Moist membranes.  NECK: Supple, No JVD. No lymphadenopathy.  PULMONARY: CTA B/L. No wheezing. No rales  CVS: Normal S1, S2. Rate and Rythm are regular. No murmurs.  ABDOMEN/GI: Soft, Nontender, Nondistended; BS present  EXTREMITIES: Peripheral pulses intact. No edema B/L LE.  NEUROLOGIC:  No motor or sensory deficit.  PSYCH: Alert & oriented x 3    Consultant(s) Notes Reviewed:  [x ] YES  [ ] NO  Care Discussed with Consultants/Other Providers [ x] YES  [ ] NO    EKG reviewed  Telemetry reviewed    LABS:                        12.1   6.95  )-----------( 273      ( 17 Sep 2020 00:39 )             39.7         141  |  105  |  15  ----------------------------<  165<H>  3.8   |  25  |  0.9    Ca    8.6      17 Sep 2020 00:39  Mg     1.8         TPro  6.5  /  Alb  3.5  /  TBili  0.8  /  DBili  x   /  AST  14  /  ALT  9   /  AlkPhos  66                RADIOLOGY & ADDITIONAL TESTS:      Imaging or report Personally Reviewed:  [ ] YES  [ ] NO    Medications:  Standing  aspirin  chewable 81 milliGRAM(s) Chew daily  atorvastatin 80 milliGRAM(s) Oral at bedtime  clopidogrel Tablet 75 milliGRAM(s) Oral daily  influenza   Vaccine 0.5 milliLiter(s) IntraMuscular once  levothyroxine 25 MICROGram(s) Oral daily  metoprolol tartrate 25 milliGRAM(s) Oral two times a day  sodium chloride 0.9%. 1000 milliLiter(s) IV Continuous <Continuous>  sodium chloride 0.9%. 1000 milliLiter(s) IV Continuous <Continuous>  sodium chloride 0.9%. 1000 milliLiter(s) IV Continuous <Continuous>    PRN Meds      Case discussed with resident    Care discussed with pt/family           RENZO ORELLANA  55y Female    CHIEF COMPLAINT:    Patient is a 55y old  Female who presents with a chief complaint of C (16 Sep 2020 15:35)      INTERVAL HPI/OVERNIGHT EVENTS:    Patient seen and examined. No cp. No sob. No palpitations.     ROS: All other systems are negative.    Vital Signs:    T(F): 96.5 (20 @ 05:00), Max: 97.6 (20 @ 21:18)  HR: 80 (20 @ 05:00) (80 - 81)  BP: 121/68 (20 @ 05:00) (121/68 - 136/74)  RR: 18 (20 @ 05:00) (18 - 18)  SpO2: 98% (20 @ 21:20) (97% - 98%)  I&O's Summary    16 Sep 2020 07:01  -  17 Sep 2020 07:00  --------------------------------------------------------  IN: 1200 mL / OUT: 0 mL / NET: 1200 mL      Daily     Daily Weight in k.2 (17 Sep 2020 05:00)  CAPILLARY BLOOD GLUCOSE          PHYSICAL EXAM:    GENERAL:  NAD  SKIN: No rashes or lesions  HENT: Atraumatic Normocephalic. PERRL. Moist membranes.  NECK: Supple, No JVD. No lymphadenopathy.  PULMONARY: CTA B/L. No wheezing. No rales  CVS: Normal S1, S2. Rate and Rhythm are regular. No murmurs.  ABDOMEN/GI: Soft, Nontender, Nondistended; BS present  EXTREMITIES: Peripheral pulses intact. No edema B/L LE.  NEUROLOGIC:  No motor or sensory deficit.  PSYCH: Alert & oriented x 3    Consultant(s) Notes Reviewed:  [x ] YES  [ ] NO  Care Discussed with Consultants/Other Providers [ x] YES  [ ] NO    EKG reviewed  Telemetry reviewed    LABS:                        12.1   6.95  )-----------( 273      ( 17 Sep 2020 00:39 )             39.7         141  |  105  |  15  ----------------------------<  165<H>  3.8   |  25  |  0.9    Ca    8.6      17 Sep 2020 00:39  Mg     1.8         TPro  6.5  /  Alb  3.5  /  TBili  0.8  /  DBili  x   /  AST  14  /  ALT  9   /  AlkPhos  66                RADIOLOGY & ADDITIONAL TESTS:    e  Imaging or report Personally Reviewed:  [ ] YES  [ ] NO    Medications:  Standing  aspirin  chewable 81 milliGRAM(s) Chew daily  atorvastatin 80 milliGRAM(s) Oral at bedtime  clopidogrel Tablet 75 milliGRAM(s) Oral daily  influenza   Vaccine 0.5 milliLiter(s) IntraMuscular once  levothyroxine 25 MICROGram(s) Oral daily  metoprolol tartrate 25 milliGRAM(s) Oral two times a day  sodium chloride 0.9%. 1000 milliLiter(s) IV Continuous <Continuous>  sodium chloride 0.9%. 1000 milliLiter(s) IV Continuous <Continuous>  sodium chloride 0.9%. 1000 milliLiter(s) IV Continuous <Continuous>    PRN Meds      Case discussed with resident    Care discussed with pt/family

## 2020-09-21 DIAGNOSIS — I25.110 ATHEROSCLEROTIC HEART DISEASE OF NATIVE CORONARY ARTERY WITH UNSTABLE ANGINA PECTORIS: ICD-10-CM

## 2020-09-21 DIAGNOSIS — E03.9 HYPOTHYROIDISM, UNSPECIFIED: ICD-10-CM

## 2020-09-21 DIAGNOSIS — I25.710 ATHEROSCLEROSIS OF AUTOLOGOUS VEIN CORONARY ARTERY BYPASS GRAFT(S) WITH UNSTABLE ANGINA PECTORIS: ICD-10-CM

## 2020-09-21 DIAGNOSIS — K46.9 UNSPECIFIED ABDOMINAL HERNIA WITHOUT OBSTRUCTION OR GANGRENE: ICD-10-CM

## 2020-09-21 DIAGNOSIS — I10 ESSENTIAL (PRIMARY) HYPERTENSION: ICD-10-CM

## 2020-09-21 DIAGNOSIS — Z95.1 PRESENCE OF AORTOCORONARY BYPASS GRAFT: ICD-10-CM

## 2020-09-21 DIAGNOSIS — Z90.5 ACQUIRED ABSENCE OF KIDNEY: ICD-10-CM

## 2020-09-21 DIAGNOSIS — Z85.528 PERSONAL HISTORY OF OTHER MALIGNANT NEOPLASM OF KIDNEY: ICD-10-CM

## 2020-09-21 DIAGNOSIS — I48.91 UNSPECIFIED ATRIAL FIBRILLATION: ICD-10-CM

## 2020-09-21 DIAGNOSIS — E66.01 MORBID (SEVERE) OBESITY DUE TO EXCESS CALORIES: ICD-10-CM

## 2020-09-21 DIAGNOSIS — I25.82 CHRONIC TOTAL OCCLUSION OF CORONARY ARTERY: ICD-10-CM

## 2020-09-21 DIAGNOSIS — R07.9 CHEST PAIN, UNSPECIFIED: ICD-10-CM

## 2020-09-21 DIAGNOSIS — Z79.01 LONG TERM (CURRENT) USE OF ANTICOAGULANTS: ICD-10-CM

## 2020-09-21 DIAGNOSIS — Z87.891 PERSONAL HISTORY OF NICOTINE DEPENDENCE: ICD-10-CM

## 2020-09-21 DIAGNOSIS — E78.5 HYPERLIPIDEMIA, UNSPECIFIED: ICD-10-CM

## 2020-09-25 NOTE — DISCHARGE NOTE ADULT - COMPLETE THE FOLLOWING IF THE PATIENT REFUSES THE INFLUENZA VACCINE:
Risks/benefits discussed with patient/surrogate
Add 17409 Cpt? (Important Note: In 2017 The Use Of 52797 Is Being Tracked By Cms To Determine Future Global Period Reimbursement For Global Periods): no
Detail Level: Detailed

## 2020-12-07 ENCOUNTER — APPOINTMENT (OUTPATIENT)
Dept: UROLOGY | Facility: CLINIC | Age: 55
End: 2020-12-07

## 2020-12-18 ENCOUNTER — OUTPATIENT (OUTPATIENT)
Dept: OUTPATIENT SERVICES | Facility: HOSPITAL | Age: 55
LOS: 1 days | Discharge: HOME | End: 2020-12-18
Payer: COMMERCIAL

## 2020-12-18 ENCOUNTER — RESULT REVIEW (OUTPATIENT)
Age: 55
End: 2020-12-18

## 2020-12-18 DIAGNOSIS — C64.9 MALIGNANT NEOPLASM OF UNSPECIFIED KIDNEY, EXCEPT RENAL PELVIS: ICD-10-CM

## 2020-12-18 DIAGNOSIS — I25.810 ATHEROSCLEROSIS OF CORONARY ARTERY BYPASS GRAFT(S) WITHOUT ANGINA PECTORIS: Chronic | ICD-10-CM

## 2020-12-18 PROCEDURE — 71260 CT THORAX DX C+: CPT | Mod: 26

## 2020-12-18 PROCEDURE — 74177 CT ABD & PELVIS W/CONTRAST: CPT | Mod: 26

## 2020-12-29 ENCOUNTER — TRANSCRIPTION ENCOUNTER (OUTPATIENT)
Age: 55
End: 2020-12-29

## 2020-12-29 ENCOUNTER — APPOINTMENT (OUTPATIENT)
Dept: UROLOGY | Facility: CLINIC | Age: 55
End: 2020-12-29
Payer: COMMERCIAL

## 2020-12-29 DIAGNOSIS — R91.8 OTHER NONSPECIFIC ABNORMAL FINDING OF LUNG FIELD: ICD-10-CM

## 2020-12-29 PROCEDURE — 99214 OFFICE O/P EST MOD 30 MIN: CPT | Mod: 95

## 2020-12-29 NOTE — LETTER HEADER
[FreeTextEntry3] : Alejandro Tipton MD\par Director of Robotic and Minimally Invasive Urologic Surgery\par \par [facility]\par Division of Urology\par [address]\par [address]\[place] [address]\par Tel [phone]\par Fax [phone]\par \par

## 2020-12-29 NOTE — ASSESSMENT
[FreeTextEntry1] : 53 F sp right robotic radical nephrectomy for a 7 cm tumor, path T1b (7cm) ccRCC FG2, foci of cystic/myxoid changes, neg margins, one hilar lymph node neg 3/2019.\par CT showed new pulmonary nodules concerning for metastatic disease now resolved, was likely secondary to covid19.\par Currently w reducible ventral hernia.\par \par Doing well. ALEXANDER\par fu gen surg re hernia\par fu 6 mo us abdomen, cxr, cmp\par

## 2020-12-29 NOTE — PHYSICAL EXAM
[General Appearance - Well Developed] : well developed [General Appearance - Well Nourished] : well nourished [Normal Appearance] : normal appearance [Well Groomed] : well groomed [General Appearance - In No Acute Distress] : no acute distress [Abdomen Soft] : soft [Edema] : no peripheral edema [] : no respiratory distress [Respiration, Rhythm And Depth] : normal respiratory rhythm and effort [Exaggerated Use Of Accessory Muscles For Inspiration] : no accessory muscle use [Oriented To Time, Place, And Person] : oriented to person, place, and time [Affect] : the affect was normal [Mood] : the mood was normal [Not Anxious] : not anxious [Normal Station and Gait] : the gait and station were normal for the patient's age [No Focal Deficits] : no focal deficits

## 2020-12-29 NOTE — LETTER BODY
[Dear  ___] : Dear  [unfilled], [Consult Letter:] : I had the pleasure of evaluating your patient, [unfilled]. [Please see my note below.] : Please see my note below. [Consult Closing:] : Thank you very much for allowing me to participate in the care of this patient.  If you have any questions, please do not hesitate to contact me. [Sincerely,] : Sincerely, [FreeTextEntry2] : Tulio Guerra MD\par [address]\[place] [address]\par  [FreeTextEntry3] : Alejandro Tipton MD\par Director of Robotic and Minimally Invasive Urologic Surgery\par [facility]\par

## 2020-12-29 NOTE — HISTORY OF PRESENT ILLNESS
[Home] : at home, [unfilled] , at the time of the visit. [Medical Office: ([facility])___] : at the medical office located in  [Verbal consent obtained from patient] : the patient, [unfilled] [FreeTextEntry1] : 53 F sp right robotic radical nephrectomy for a 7 cm tumor, path T1b (7cm) ccRCC FG2, foci of cystic/myxoid changes, neg margins, one hilar lymph node neg 3/2019.\par CT showed new pulmonary nodules concerning for metastatic disease now resolved, was likely secondary to covid19.\par Currently w reducible ventral hernia.\par \par Doing well however she did have cardiac stents placed October 2020..\par No hematuria. NO flank pain. States has non painful abdominal bulge which developed after severe coughing when pt had covid, hernia to be done ~6 months.\par \par CT chest abdomen pelvis images visualized from December 2020 demonstrating no evidence of metastatic disease right kidney is unremarkable.  Ventral hernia visible.\par Cr 1.09  12/2020\par eGFR 57\par \par Previously Of note ~1/2019 pt was first dx w influeneza, CXR revealed a hilar cavitary mass and chest CT showed --1. Extensive mediastinal, bilateral hilar, and right supraclavicular \par lymphadenopathy \par She had a follow up PET scan approx 3 weeks later which was neg for any metastatic disease in the C/A/P.\par

## 2021-03-08 ENCOUNTER — NON-APPOINTMENT (OUTPATIENT)
Age: 56
End: 2021-03-08

## 2021-04-06 ENCOUNTER — APPOINTMENT (OUTPATIENT)
Dept: SURGERY | Facility: CLINIC | Age: 56
End: 2021-04-06
Payer: COMMERCIAL

## 2021-04-06 VITALS — BODY MASS INDEX: 47.09 KG/M2 | WEIGHT: 293 LBS | HEIGHT: 66 IN

## 2021-04-06 PROCEDURE — 99072 ADDL SUPL MATRL&STAF TM PHE: CPT

## 2021-04-06 PROCEDURE — 99214 OFFICE O/P EST MOD 30 MIN: CPT

## 2021-04-06 NOTE — ASSESSMENT
[FreeTextEntry1] : [place] presents back to the office approximately 7 months after her initial visit with me ready to schedule her incisional hernia repair. In the interim, she had 2 coronary artery stents placed and is currently on antiplatelet medications in addition to her Eliquis. She has lost 8 pounds as well.\par \par Physical examination demonstrates a large tender bulge just left superior lateral to her generous midline incision consistent with a large reducible incisional hernia warranting surgical repair. It appears to have enlarged in size since her initial visit with me. Physical examination also demonstrates a tender incarcerated strawberry size bulge in the subxiphoid location associated with the inferior aspect of her generous CABG incision consistent with an incarcerated incisional hernia also confirmed by recent CT scan. I reviewed the images personally with [place]. These hernias are complicated by a moderate to large diastasis recti which is likely related to her excess abdominal weight. Despite her recent weight lost, her current BMI is now 47.6.\par \par I explained the pros and cons of surgery, as well as all risks, benefits, indications and alternatives of the procedure and the patient understood and agreed. [place] was scheduled for the repair of her incisional hernia with mesh and repair of her incarcerated subxiphoid incisional hernia with mesh on Monday, June 28, 2021 under LOCAL with IV SED[facility] with presurgical testing preceding this date. She was encouraged to avoid heavy lifting and strenuous activity in the interim, of course.\par \par She informed me that she will be off her antiplatelet medication by early June according to her cardiologist. She will hold her Eliquis 3 days prior to surgery and may resume it the day after her procedure. We also discussed the importance of calorie restriction and healthy eating with regard to weight loss, hernia recurrence and her overall health.\par \par

## 2021-04-06 NOTE — DATA REVIEWED
[FreeTextEntry1] : Reviewed recent CT scan of abdomen and pelvis report and images from December 2020

## 2021-04-06 NOTE — PHYSICAL EXAM
[Normal Breath Sounds] : Normal breath sounds [No Rash or Lesion] : No rash or lesion [Alert] : alert [Calm] : calm [JVD] : no jugular venous distention  [de-identified] : overweight [de-identified] : normal [de-identified] : protuberant abdomen, large diastasis recti\par  [de-identified] : large reducible incisional hernia; smaller incarcerated subxiphoid incisional hernia

## 2021-05-14 ENCOUNTER — OUTPATIENT (OUTPATIENT)
Dept: OUTPATIENT SERVICES | Facility: HOSPITAL | Age: 56
LOS: 1 days | Discharge: HOME | End: 2021-05-14
Payer: MEDICAID

## 2021-05-14 DIAGNOSIS — Z12.31 ENCOUNTER FOR SCREENING MAMMOGRAM FOR MALIGNANT NEOPLASM OF BREAST: ICD-10-CM

## 2021-05-14 DIAGNOSIS — I25.810 ATHEROSCLEROSIS OF CORONARY ARTERY BYPASS GRAFT(S) WITHOUT ANGINA PECTORIS: Chronic | ICD-10-CM

## 2021-05-14 PROCEDURE — 77063 BREAST TOMOSYNTHESIS BI: CPT | Mod: 26

## 2021-05-14 PROCEDURE — 77067 SCR MAMMO BI INCL CAD: CPT | Mod: 26

## 2021-06-07 ENCOUNTER — OUTPATIENT (OUTPATIENT)
Dept: OUTPATIENT SERVICES | Facility: HOSPITAL | Age: 56
LOS: 1 days | Discharge: HOME | End: 2021-06-07
Payer: COMMERCIAL

## 2021-06-07 VITALS
WEIGHT: 281.97 LBS | HEART RATE: 83 BPM | TEMPERATURE: 97 F | OXYGEN SATURATION: 100 % | SYSTOLIC BLOOD PRESSURE: 170 MMHG | RESPIRATION RATE: 16 BRPM | HEIGHT: 66 IN | DIASTOLIC BLOOD PRESSURE: 94 MMHG

## 2021-06-07 DIAGNOSIS — K43.0 INCISIONAL HERNIA WITH OBSTRUCTION, WITHOUT GANGRENE: ICD-10-CM

## 2021-06-07 DIAGNOSIS — I25.810 ATHEROSCLEROSIS OF CORONARY ARTERY BYPASS GRAFT(S) WITHOUT ANGINA PECTORIS: Chronic | ICD-10-CM

## 2021-06-07 DIAGNOSIS — K43.2 INCISIONAL HERNIA WITHOUT OBSTRUCTION OR GANGRENE: ICD-10-CM

## 2021-06-07 DIAGNOSIS — Z90.5 ACQUIRED ABSENCE OF KIDNEY: Chronic | ICD-10-CM

## 2021-06-07 DIAGNOSIS — Z01.818 ENCOUNTER FOR OTHER PREPROCEDURAL EXAMINATION: ICD-10-CM

## 2021-06-07 DIAGNOSIS — Z95.5 PRESENCE OF CORONARY ANGIOPLASTY IMPLANT AND GRAFT: Chronic | ICD-10-CM

## 2021-06-07 LAB
ALBUMIN SERPL ELPH-MCNC: 4 G/DL — SIGNIFICANT CHANGE UP (ref 3.5–5.2)
ALP SERPL-CCNC: 89 U/L — SIGNIFICANT CHANGE UP (ref 30–115)
ALT FLD-CCNC: 13 U/L — SIGNIFICANT CHANGE UP (ref 0–41)
ANION GAP SERPL CALC-SCNC: 14 MMOL/L — SIGNIFICANT CHANGE UP (ref 7–14)
APPEARANCE UR: CLEAR — SIGNIFICANT CHANGE UP
APTT BLD: 40.7 SEC — HIGH (ref 27–39.2)
AST SERPL-CCNC: 14 U/L — SIGNIFICANT CHANGE UP (ref 0–41)
BASOPHILS # BLD AUTO: 0.05 K/UL — SIGNIFICANT CHANGE UP (ref 0–0.2)
BASOPHILS NFR BLD AUTO: 0.6 % — SIGNIFICANT CHANGE UP (ref 0–1)
BILIRUB SERPL-MCNC: 0.6 MG/DL — SIGNIFICANT CHANGE UP (ref 0.2–1.2)
BILIRUB UR-MCNC: NEGATIVE — SIGNIFICANT CHANGE UP
BUN SERPL-MCNC: 25 MG/DL — HIGH (ref 10–20)
CALCIUM SERPL-MCNC: 9.5 MG/DL — SIGNIFICANT CHANGE UP (ref 8.5–10.1)
CHLORIDE SERPL-SCNC: 103 MMOL/L — SIGNIFICANT CHANGE UP (ref 98–110)
CO2 SERPL-SCNC: 23 MMOL/L — SIGNIFICANT CHANGE UP (ref 17–32)
COLOR SPEC: YELLOW — SIGNIFICANT CHANGE UP
CREAT SERPL-MCNC: 1 MG/DL — SIGNIFICANT CHANGE UP (ref 0.7–1.5)
DIFF PNL FLD: NEGATIVE — SIGNIFICANT CHANGE UP
EOSINOPHIL # BLD AUTO: 0.13 K/UL — SIGNIFICANT CHANGE UP (ref 0–0.7)
EOSINOPHIL NFR BLD AUTO: 1.6 % — SIGNIFICANT CHANGE UP (ref 0–8)
GLUCOSE SERPL-MCNC: 124 MG/DL — HIGH (ref 70–99)
GLUCOSE UR QL: NEGATIVE — SIGNIFICANT CHANGE UP
HCT VFR BLD CALC: 42.3 % — SIGNIFICANT CHANGE UP (ref 37–47)
HGB BLD-MCNC: 13.1 G/DL — SIGNIFICANT CHANGE UP (ref 12–16)
IMM GRANULOCYTES NFR BLD AUTO: 0.4 % — HIGH (ref 0.1–0.3)
INR BLD: 1.39 RATIO — HIGH (ref 0.65–1.3)
KETONES UR-MCNC: NEGATIVE — SIGNIFICANT CHANGE UP
LEUKOCYTE ESTERASE UR-ACNC: NEGATIVE — SIGNIFICANT CHANGE UP
LYMPHOCYTES # BLD AUTO: 1.8 K/UL — SIGNIFICANT CHANGE UP (ref 1.2–3.4)
LYMPHOCYTES # BLD AUTO: 21.7 % — SIGNIFICANT CHANGE UP (ref 20.5–51.1)
MCHC RBC-ENTMCNC: 24.7 PG — LOW (ref 27–31)
MCHC RBC-ENTMCNC: 31 G/DL — LOW (ref 32–37)
MCV RBC AUTO: 79.8 FL — LOW (ref 81–99)
MONOCYTES # BLD AUTO: 0.43 K/UL — SIGNIFICANT CHANGE UP (ref 0.1–0.6)
MONOCYTES NFR BLD AUTO: 5.2 % — SIGNIFICANT CHANGE UP (ref 1.7–9.3)
NEUTROPHILS # BLD AUTO: 5.86 K/UL — SIGNIFICANT CHANGE UP (ref 1.4–6.5)
NEUTROPHILS NFR BLD AUTO: 70.5 % — SIGNIFICANT CHANGE UP (ref 42.2–75.2)
NITRITE UR-MCNC: NEGATIVE — SIGNIFICANT CHANGE UP
NRBC # BLD: 0 /100 WBCS — SIGNIFICANT CHANGE UP (ref 0–0)
PH UR: 5.5 — SIGNIFICANT CHANGE UP (ref 5–8)
PLATELET # BLD AUTO: 346 K/UL — SIGNIFICANT CHANGE UP (ref 130–400)
POTASSIUM SERPL-MCNC: 4.1 MMOL/L — SIGNIFICANT CHANGE UP (ref 3.5–5)
POTASSIUM SERPL-SCNC: 4.1 MMOL/L — SIGNIFICANT CHANGE UP (ref 3.5–5)
PROT SERPL-MCNC: 7.5 G/DL — SIGNIFICANT CHANGE UP (ref 6–8)
PROT UR-MCNC: SIGNIFICANT CHANGE UP
PROTHROM AB SERPL-ACNC: 16 SEC — HIGH (ref 9.95–12.87)
RBC # BLD: 5.3 M/UL — SIGNIFICANT CHANGE UP (ref 4.2–5.4)
RBC # FLD: 15.9 % — HIGH (ref 11.5–14.5)
SODIUM SERPL-SCNC: 140 MMOL/L — SIGNIFICANT CHANGE UP (ref 135–146)
SP GR SPEC: 1.03 — SIGNIFICANT CHANGE UP (ref 1.01–1.03)
UROBILINOGEN FLD QL: SIGNIFICANT CHANGE UP
WBC # BLD: 8.3 K/UL — SIGNIFICANT CHANGE UP (ref 4.8–10.8)
WBC # FLD AUTO: 8.3 K/UL — SIGNIFICANT CHANGE UP (ref 4.8–10.8)

## 2021-06-07 PROCEDURE — 93010 ELECTROCARDIOGRAM REPORT: CPT

## 2021-06-07 NOTE — H&P PST ADULT - NS PRO FEM REPRO HEALTH SCREEN
Progress Note - Marshal Scheuermann 1951, 79 y o  male MRN: [id number]    Unit/Bed#: ICU 01 Encounter: [id number]    Primary Care Provider: Claudetta Bustle, MD   Date and time admitted to hospital: 1/18/2021  3:51 PM        * Chest pain, unspecified  Assessment & Plan  · Currently resolved  · Possibly secondary to the initial hypertensive urgency versus unstable angina the setting having history of CAD, diabetes and end-stage renal disease  · Status post a Cardiology evaluation  · Status post 2D echocardiogram testing-no regional wall motion abnormalities  · Case reviewed with Cardiology- patient to remain in-house to get an inpatient nuclear stress test tomorrow on 01/20/2021  · Continue current cardiac based medications which include aspirin, carvedilol, lisinopril, and atorvastatin  · Potential discharge planning for 01/20/2021 after the stress test    Elevated troponin  Assessment & Plan  · Troponin trend 0 13> 0 17> 0 23> 0 41  · Most likely secondary to hypertensive urgency in the setting having end-stage renal disease hemodialysis  · Non MI related elevated troponinemia  · Further management as per Cardiology    Type 2 diabetes mellitus without complication, with long-term [facility]  Assessment & Plan  · Continue Accu-Cheks AC and HS  · Continue Lantus with sliding scale    Essential hypertension  Assessment & Plan  · Continue blood pressure medications amlodipine, Coreg, Cardura and lisinopril    ESRD (end stage renal disease) ([facility] )  Assessment & Plan  Lab Results   Component Value Date    EGFR 7 01/19/2021    EGFR 11 01/18/2021    EGFR 7 03/19/2020    CREATININE 7 84 (H) 01/19/2021    CREATININE 5 43 (H) 01/18/2021    CREATININE 7 64 (H) 03/19/2020     · Dialysis days Mondays, Wednesdays and Fridays  · Formal nephrology consultation is pending  · Patient usually follows with [facility]    Mixed hyperlipidemia  Assessment & Plan  · Continue atorvastatin        VTE Prophylaxis:  Heparin    Patient Centered Rounds: I have performed bedside rounds with nursing staff today  Discussions with Specialists or Other Care Team Provider:  Cardiology, case management, nursing, pharmacy  Education and Discussions with Family / Patient:  Patient was brought up to par with the plan of care for today, he did not ask me to call any family members    Current Length of Stay: 1 day(s)    Current Patient Status: Inpatient   Certification Statement: The patient will continue to require additional inpatient hospital stay due to The need for inpatient stress test    Discharge Plan:  Hopeful discharge planning in 24-48 hours    Code Status: Level 1 - Full Code    Subjective:   Patient seen and examined, resting comfortably in bed, no new complaints, no distress  Denies any current chest pain    Objective:     Vitals:   Temp (24hrs), Av 3 °F (36 8 °C), Min:96 9 °F (36 1 °C), Max:99 5 °F (37 5 °C)    Temp:  [96 9 °F (36 1 °C)-99 5 °F (37 5 °C)] 98 6 °F (37 °C)  HR:  [63-79] 70  Resp:  [16-34] 22  BP: (141-218)/() 157/69  SpO2:  [94 %-100 %] 96 %  Body mass index is 28 82 kg/m²  Input and Output Summary (last 24 hours): Intake/Output Summary (Last 24 hours) at 2021 1044  Last data filed at 2021 2200  Gross per 24 hour   Intake    Output 100 ml   Net -100 ml       Physical Exam:   Physical Exam  Vitals signs and nursing note reviewed  Constitutional:       General: He is not in acute distress  Appearance: Normal appearance  He is not ill-appearing  HENT:      Head: Normocephalic and atraumatic  Nose: Nose normal    Eyes:      Extraocular Movements: Extraocular movements intact  Pupils: Pupils are equal, round, and reactive to light  Neck:      Musculoskeletal: Normal range of motion and neck supple  No neck rigidity or muscular tenderness  Cardiovascular:      Rate and Rhythm: Normal rate and regular rhythm        Pulses: Normal pulses  Heart sounds: Normal heart sounds  No murmur  No friction rub  No gallop  Pulmonary:      Effort: Pulmonary effort is normal       Breath sounds: Normal breath sounds  Abdominal:      General: There is no distension  Palpations: Abdomen is soft  There is no mass  Tenderness: There is no abdominal tenderness  There is no guarding or rebound  Musculoskeletal: Normal range of motion  General: No swelling or tenderness  Right lower leg: No edema  Left lower leg: No edema  Skin:     General: Skin is warm  Capillary Refill: Capillary refill takes less than 2 seconds  Findings: No erythema or rash  Neurological:      General: No focal deficit present  Mental Status: He is alert and oriented to person, place, and time  Mental status is at baseline  Psychiatric:         Mood and Affect: Mood normal          Behavior: Behavior normal          Additional Data:     Labs:    Results from last 7 days   Lab Units 01/19/21  0503   WBC Thousand/uL 6 00   HEMOGLOBIN g/dL 11 1*   HEMATOCRIT % 33 5*   PLATELETS Thousands/uL 87*   NEUTROS PCT % 63   LYMPHS PCT % 19*   MONOS PCT % 16*   EOS PCT % 2     Results from last 7 days   Lab Units 01/19/21  0504 01/18/21  1622   SODIUM mmol/L 135 135   POTASSIUM mmol/L 4 7 3 8   CHLORIDE mmol/L 94* 92*   CO2 mmol/L 31 36*   BUN mg/dL 35* 23   CREATININE mg/dL 7 84* 5 43*   CALCIUM mg/dL 9 4 9 9   ALK PHOS U/L  --  36*   ALT U/L  --  6*   AST U/L  --  10*         Results from last 7 days   Lab Units 01/19/21  0509   POC GLUCOSE mg/dl 97     Results from last 7 days   Lab Units 01/19/21  0503   HEMOGLOBIN A1C % 6 9*       * I Have Reviewed All Lab Data Listed Above  * Additional Pertinent Lab Tests Reviewed:  [facility] Admission  Reviewed    Imaging:  Imaging Reports Reviewed Today Include:  None    Recent Cultures (last 7 days):           Last 24 Hours Medication List:   Current Facility-Administered Medications   Medication Dose Route Frequency Provider Last Rate    acetaminophen  650 mg Oral Q6H PRN Ashlyn Schulz MD      amLODIPine  10 mg Oral Daily Ashlyn Schulz MD      aspirin  81 mg Oral Daily Ashlyn Schulz MD      atorvastatin  40 mg Oral Daily Ashlyn Schulz MD      carvedilol  25 mg Oral BID With Meals Ashlyn Schulz MD      doxazosin  4 mg Oral Daily Ashlyn Schulz MD      heparin (porcine)  5,000 Units Subcutaneous [facility] Ashlyn Schulz MD      hydrALAZINE  10 mg Intravenous Q6H PRN Ashlyn Schulz MD      hydrALAZINE  50 mg Oral [facility] Ashlyn Schulz MD      insulin glargine  6 Units Subcutaneous HS Kimmie Hubbard MD      insulin lispro  1-5 Units Subcutaneous TID [facility] Ashlyn Schulz MD      lisinopril  40 mg Oral Daily Ashlyn Schulz MD      nitroglycerin  0 4 mg Sublingual Q5 Min PRN Ashlyn Schulz MD      ondansetron  4 mg Intravenous Q6H PRN Ashlyn Schulz MD      sevelamer  1,600 mg Oral TID With Meals Ashlyn Schulz MD      sodium chloride (PF)  3 mL Intravenous Q1H PRN Eloisa Garvey MD          Today, Patient Was Seen By: Kimmie Hubbard MD    ** Please Note: Dictation voice to text software may have been used in the creation of this document   ** mammogram

## 2021-06-07 NOTE — H&P PST ADULT - REASON FOR ADMISSION
57 yo female presents for PAST in preparation for incisional hernia repair with mesh and incarcerated supxiphoid incisional hernia repair with mesh on 6/28/2021 under LSB by Dr. Denny ([facility]).

## 2021-06-07 NOTE — H&P PST ADULT - NSICDXPASTSURGICALHX_GEN_ALL_CORE_FT
PAST SURGICAL HISTORY:  CAD (coronary artery disease) of bypass graft 1995    H/O right nephrectomy     Presence of stent in coronary artery x2

## 2021-06-07 NOTE — H&P PST ADULT - EXTREMITIES COMMENTS
Bilateral feet slightly cyanotic; pt states "my feet has always been that color". Capillary refill is less than 3 seconds. Pt denies any discomfort.

## 2021-06-07 NOTE — H&P PST ADULT - HISTORY OF PRESENT ILLNESS
Pt states for the past year she noticed a buldging in her abdomen. Pt denies any symptoms. States "I just want to have this thing repaired. I am tired of seeing it." Now for listed procedure. Denies any chest pain, difficulty breathing, SOB, palpitations, dysuria, URI, or any other infections in the last 2 weeks. Denies any recent travel, contact, or exposure to any persons with known or suspected COVID-19. Pt also denies COVID testing within the last 2 weeks. Pt admits to receiving all doses of Moderna COVID vaccine. Denies any suicidal or homicidal ideations. Pt advised to self quarantine until day of procedure. Exercise tolerance of 1 flight of stairs without dyspnea. ELVIN reviewed with patient. Pt verbalized understanding of all pre-operative instructions.    Anesthesia Alert  NO--Difficult Airway  NO--History of neck surgery or radiation  NO--Limited ROM of neck  NO--History of Malignant hyperthermia  NO--No personal or family history of Pseudocholinesterase deficiency.  NO--Prior Anesthesia Complication  NO--Latex Allergy  NO--Loose teeth  NO--History of Rheumatoid Arthritis  NO--ELVIN  YES--Bleeding Risk  NO--Other_____

## 2021-06-07 NOTE — H&P PST ADULT - NSANTHGENDERRD_ENT_A_CORE
no indicators present
No
Complex Repair And Double M Plasty Text: The defect edges were debeveled with a #15 scalpel blade.  The primary defect was closed partially with a complex linear closure.  Given the location of the remaining defect, shape of the defect and the proximity to free margins a double M plasty was deemed most appropriate for complete closure of the defect.  Using a sterile surgical marker, an appropriate advancement flap was drawn incorporating the defect and placing the expected incisions within the relaxed skin tension lines where possible.    The area thus outlined was incised deep to adipose tissue with a #15 scalpel blade.  The skin margins were undermined to an appropriate distance in all directions utilizing iris scissors.

## 2021-06-07 NOTE — H&P PST ADULT - NSICDXPASTMEDICALHX_GEN_ALL_CORE_FT
PAST MEDICAL HISTORY:  Afib since jan 2019    CAD (coronary artery disease) of bypass graft at age 25    Heart disease TRIPLE BYPASS    High cholesterol     Hypothyroid     Obesity     Renal carcinoma

## 2021-06-11 ENCOUNTER — TRANSCRIPTION ENCOUNTER (OUTPATIENT)
Age: 56
End: 2021-06-11

## 2021-06-11 ENCOUNTER — EMERGENCY (EMERGENCY)
Facility: HOSPITAL | Age: 56
LOS: 0 days | Discharge: HOME | End: 2021-06-12
Attending: EMERGENCY MEDICINE | Admitting: EMERGENCY MEDICINE
Payer: COMMERCIAL

## 2021-06-11 VITALS
HEART RATE: 92 BPM | OXYGEN SATURATION: 100 % | WEIGHT: 285.06 LBS | HEIGHT: 66 IN | RESPIRATION RATE: 20 BRPM | TEMPERATURE: 98 F | DIASTOLIC BLOOD PRESSURE: 96 MMHG | SYSTOLIC BLOOD PRESSURE: 181 MMHG

## 2021-06-11 DIAGNOSIS — E78.5 HYPERLIPIDEMIA, UNSPECIFIED: ICD-10-CM

## 2021-06-11 DIAGNOSIS — Z90.5 ACQUIRED ABSENCE OF KIDNEY: Chronic | ICD-10-CM

## 2021-06-11 DIAGNOSIS — Z95.1 PRESENCE OF AORTOCORONARY BYPASS GRAFT: ICD-10-CM

## 2021-06-11 DIAGNOSIS — Z79.01 LONG TERM (CURRENT) USE OF ANTICOAGULANTS: ICD-10-CM

## 2021-06-11 DIAGNOSIS — Z79.899 OTHER LONG TERM (CURRENT) DRUG THERAPY: ICD-10-CM

## 2021-06-11 DIAGNOSIS — I25.10 ATHEROSCLEROTIC HEART DISEASE OF NATIVE CORONARY ARTERY WITHOUT ANGINA PECTORIS: ICD-10-CM

## 2021-06-11 DIAGNOSIS — Z95.5 PRESENCE OF CORONARY ANGIOPLASTY IMPLANT AND GRAFT: Chronic | ICD-10-CM

## 2021-06-11 DIAGNOSIS — Z79.890 HORMONE REPLACEMENT THERAPY: ICD-10-CM

## 2021-06-11 DIAGNOSIS — R22.0 LOCALIZED SWELLING, MASS AND LUMP, HEAD: ICD-10-CM

## 2021-06-11 DIAGNOSIS — I48.91 UNSPECIFIED ATRIAL FIBRILLATION: ICD-10-CM

## 2021-06-11 DIAGNOSIS — I25.810 ATHEROSCLEROSIS OF CORONARY ARTERY BYPASS GRAFT(S) WITHOUT ANGINA PECTORIS: Chronic | ICD-10-CM

## 2021-06-11 DIAGNOSIS — K03.81 CRACKED TOOTH: ICD-10-CM

## 2021-06-11 DIAGNOSIS — K02.9 DENTAL CARIES, UNSPECIFIED: ICD-10-CM

## 2021-06-11 DIAGNOSIS — Z85.528 PERSONAL HISTORY OF OTHER MALIGNANT NEOPLASM OF KIDNEY: ICD-10-CM

## 2021-06-11 DIAGNOSIS — E03.9 HYPOTHYROIDISM, UNSPECIFIED: ICD-10-CM

## 2021-06-11 DIAGNOSIS — Z79.02 LONG TERM (CURRENT) USE OF ANTITHROMBOTICS/ANTIPLATELETS: ICD-10-CM

## 2021-06-11 DIAGNOSIS — K04.7 PERIAPICAL ABSCESS WITHOUT SINUS: ICD-10-CM

## 2021-06-11 DIAGNOSIS — Z90.5 ACQUIRED ABSENCE OF KIDNEY: ICD-10-CM

## 2021-06-11 DIAGNOSIS — Z80.1 FAMILY HISTORY OF MALIGNANT NEOPLASM OF TRACHEA, BRONCHUS AND LUNG: ICD-10-CM

## 2021-06-11 DIAGNOSIS — E78.00 PURE HYPERCHOLESTEROLEMIA, UNSPECIFIED: ICD-10-CM

## 2021-06-11 LAB
ALBUMIN SERPL ELPH-MCNC: 3.9 G/DL — SIGNIFICANT CHANGE UP (ref 3.5–5.2)
ALP SERPL-CCNC: 81 U/L — SIGNIFICANT CHANGE UP (ref 30–115)
ALT FLD-CCNC: 13 U/L — SIGNIFICANT CHANGE UP (ref 0–41)
ANION GAP SERPL CALC-SCNC: 10 MMOL/L — SIGNIFICANT CHANGE UP (ref 7–14)
AST SERPL-CCNC: 23 U/L — SIGNIFICANT CHANGE UP (ref 0–41)
BASOPHILS # BLD AUTO: 0.04 K/UL — SIGNIFICANT CHANGE UP (ref 0–0.2)
BASOPHILS NFR BLD AUTO: 0.4 % — SIGNIFICANT CHANGE UP (ref 0–1)
BILIRUB SERPL-MCNC: 0.2 MG/DL — SIGNIFICANT CHANGE UP (ref 0.2–1.2)
BUN SERPL-MCNC: 23 MG/DL — HIGH (ref 10–20)
CALCIUM SERPL-MCNC: 9.4 MG/DL — SIGNIFICANT CHANGE UP (ref 8.5–10.1)
CHLORIDE SERPL-SCNC: 107 MMOL/L — SIGNIFICANT CHANGE UP (ref 98–110)
CO2 SERPL-SCNC: 24 MMOL/L — SIGNIFICANT CHANGE UP (ref 17–32)
CREAT SERPL-MCNC: 1 MG/DL — SIGNIFICANT CHANGE UP (ref 0.7–1.5)
EOSINOPHIL # BLD AUTO: 0.18 K/UL — SIGNIFICANT CHANGE UP (ref 0–0.7)
EOSINOPHIL NFR BLD AUTO: 1.7 % — SIGNIFICANT CHANGE UP (ref 0–8)
GLUCOSE SERPL-MCNC: 122 MG/DL — HIGH (ref 70–99)
HCT VFR BLD CALC: 41.2 % — SIGNIFICANT CHANGE UP (ref 37–47)
HGB BLD-MCNC: 12.7 G/DL — SIGNIFICANT CHANGE UP (ref 12–16)
IMM GRANULOCYTES NFR BLD AUTO: 0.4 % — HIGH (ref 0.1–0.3)
LYMPHOCYTES # BLD AUTO: 1.97 K/UL — SIGNIFICANT CHANGE UP (ref 1.2–3.4)
LYMPHOCYTES # BLD AUTO: 18.3 % — LOW (ref 20.5–51.1)
MCHC RBC-ENTMCNC: 24.6 PG — LOW (ref 27–31)
MCHC RBC-ENTMCNC: 30.8 G/DL — LOW (ref 32–37)
MCV RBC AUTO: 79.7 FL — LOW (ref 81–99)
MONOCYTES # BLD AUTO: 0.54 K/UL — SIGNIFICANT CHANGE UP (ref 0.1–0.6)
MONOCYTES NFR BLD AUTO: 5 % — SIGNIFICANT CHANGE UP (ref 1.7–9.3)
NEUTROPHILS # BLD AUTO: 7.97 K/UL — HIGH (ref 1.4–6.5)
NEUTROPHILS NFR BLD AUTO: 74.2 % — SIGNIFICANT CHANGE UP (ref 42.2–75.2)
NRBC # BLD: 0 /100 WBCS — SIGNIFICANT CHANGE UP (ref 0–0)
PLATELET # BLD AUTO: 345 K/UL — SIGNIFICANT CHANGE UP (ref 130–400)
POTASSIUM SERPL-MCNC: 4.8 MMOL/L — SIGNIFICANT CHANGE UP (ref 3.5–5)
POTASSIUM SERPL-SCNC: 4.8 MMOL/L — SIGNIFICANT CHANGE UP (ref 3.5–5)
PROT SERPL-MCNC: 7.1 G/DL — SIGNIFICANT CHANGE UP (ref 6–8)
RBC # BLD: 5.17 M/UL — SIGNIFICANT CHANGE UP (ref 4.2–5.4)
RBC # FLD: 15.7 % — HIGH (ref 11.5–14.5)
SODIUM SERPL-SCNC: 141 MMOL/L — SIGNIFICANT CHANGE UP (ref 135–146)
WBC # BLD: 10.74 K/UL — SIGNIFICANT CHANGE UP (ref 4.8–10.8)
WBC # FLD AUTO: 10.74 K/UL — SIGNIFICANT CHANGE UP (ref 4.8–10.8)

## 2021-06-11 PROCEDURE — 70487 CT MAXILLOFACIAL W/DYE: CPT | Mod: 26,MA

## 2021-06-11 PROCEDURE — 99285 EMERGENCY DEPT VISIT HI MDM: CPT

## 2021-06-11 RX ORDER — KETOROLAC TROMETHAMINE 30 MG/ML
15 SYRINGE (ML) INJECTION ONCE
Refills: 0 | Status: DISCONTINUED | OUTPATIENT
Start: 2021-06-11 | End: 2021-06-11

## 2021-06-11 RX ORDER — SODIUM CHLORIDE 9 MG/ML
1000 INJECTION, SOLUTION INTRAVENOUS ONCE
Refills: 0 | Status: COMPLETED | OUTPATIENT
Start: 2021-06-11 | End: 2021-06-11

## 2021-06-11 RX ADMIN — Medication 100 MILLIGRAM(S): at 21:13

## 2021-06-11 RX ADMIN — Medication 15 MILLIGRAM(S): at 23:49

## 2021-06-11 RX ADMIN — SODIUM CHLORIDE 1000 MILLILITER(S): 9 INJECTION, SOLUTION INTRAVENOUS at 21:12

## 2021-06-11 RX ADMIN — Medication 15 MILLIGRAM(S): at 20:20

## 2021-06-11 NOTE — ED ADULT NURSE NOTE - PSH
CAD (coronary artery disease) of bypass graft  1995  H/O right nephrectomy    Presence of stent in coronary artery  x2

## 2021-06-11 NOTE — ED ADULT NURSE NOTE - OBJECTIVE STATEMENT
Pt has left sided facial swelling onset today. patient noted to have dental carries and missing teeth to left upper molar area. paitent denies fever

## 2021-06-11 NOTE — ED ADULT TRIAGE NOTE - CHIEF COMPLAINT QUOTE
left sided facial swelling onset today. patient noted to have dental carries and missing teeth to left upper molar area. paitent denies fever

## 2021-06-11 NOTE — ED PROVIDER NOTE - PHYSICAL EXAMINATION
Physical Exam    Vital Signs: I have reviewed the initial vital signs.  Constitutional: well-nourished, appears stated age, no acute distress  Eyes: Conjunctiva pink, Sclera clear, PERRLA, EOMI without pain. no periorbital edema.   ENT: Oropharynx is clear without lesions. uvula midline. no tonsillar erythema, edema, or exudates. no stridor. no pta. floor of the mouth is soft without pus. pt has poor dentition with multiple dental caries and cracked teeth. no visible dental abscess; however swelling and fullness palpable in the left upper buccal mucosa and tenderness over the left cheek.   Cardiovascular: S1 and S2, regular rate, regular rhythm, well-perfused extremities, radial pulses equal and 2+ b/l.   Respiratory: unlabored respiratory effort, clear to auscultation bilaterally no wheezing, rales and rhonchi. pt is speaking full sentences. no accessory muscle use.   Gastrointestinal: soft, non-tender, nondistended abdomen, no pulsatile mass, normal bowl sounds, no rebound, no guarding, no organomegaly. .   Musculoskeletal: supple neck, FROM of b/l upper and lower extremities.   Integumentary: warm, dry, no rash  Neurologic: awake, alert  Psychiatric: appropriate mood, appropriate affect

## 2021-06-11 NOTE — ED PROVIDER NOTE - OBJECTIVE STATEMENT
55 y/o female with a PMH of afib on eliquis, nephrectomy due to renal ca, cad with two stents, hypothyroidism, and hld presents to the ED for evaluation of left facial swelling that began today. pt reports she was seen by [facility] and advised to visit the ED. pt reports she woke up with the facial swelling. pt denies fever, chills, recent trauma, recent dental procedures, tooth pain, drainage from the teeth, sore throat, visual changes, or headache.

## 2021-06-11 NOTE — ED PROVIDER NOTE - NSFOLLOWUPCLINICS_GEN_ALL_ED_FT
[facility]  Dental  [address] [place] [id number]  Phone: [phone]  Fax:   Follow Up Time: 1-3 Days

## 2021-06-11 NOTE — ED ADULT NURSE NOTE - NSIMPLEMENTINTERV_GEN_ALL_ED
Implemented All Universal Safety Interventions:  [place] to call system. Call bell, personal items and telephone within reach. Instruct patient to call for assistance. Room bathroom lighting operational. Non-slip footwear when patient is off stretcher. Physically safe environment: no spills, clutter or unnecessary equipment. Stretcher in lowest position, wheels locked, appropriate side rails in place.

## 2021-06-11 NOTE — ED PROVIDER NOTE - NSFOLLOWUPINSTRUCTIONS_ED_ALL_ED_FT
Dental Abscess    WHAT YOU NEED TO KNOW:    A dental abscess is a collection of pus in or around a tooth. A dental abscess is caused by bacteria. The bacteria usually enter the tooth when the enamel (outer part of the tooth) is damaged by tooth decay. Bacteria may also enter the tooth through a break or chip in the tooth, or a cut in the gum. Food particles that are stuck between the teeth for a long time may also lead to an abscess.          DISCHARGE INSTRUCTIONS:    Return to the emergency department if:     You have severe pain.      You have trouble breathing because of pain or swelling.    Contact your healthcare provider if:     Your symptoms get worse, even after treatment.      Your mouth is bleeding.      You cannot eat or drink because of pain or swelling.      Your abscess returns.      You have an injury that causes a crack in your tooth.      You have questions or concerns about your condition or care.    Medicines: You may need any of the following:     Antibiotics help treat a bacterial infection.       NSAIDs, such as ibuprofen, help decrease swelling, pain, and fever. This medicine is available with or without a doctor's order. NSAIDs can cause stomach bleeding or kidney problems in certain people. If you take blood thinner medicine, always ask your healthcare provider if NSAIDs are safe for you. Always read the medicine label and follow directions.      Acetaminophen decreases pain and fever. It is available without a doctor's order. Ask how much to take and how often to take it. Follow directions. Read the labels of all other medicines you are using to see if they also contain acetaminophen, or ask your doctor or pharmacist. Acetaminophen can cause liver damage if not taken correctly. Do not use more than 4 grams (4,000 milligrams) total of acetaminophen in one day.       Prescription pain medicine may be given. Ask your healthcare provider how to take this medicine safely. Some prescription pain medicines contain acetaminophen. Do not take other medicines that contain acetaminophen without talking to your healthcare provider. Too much acetaminophen may cause liver damage. Prescription pain medicine may cause constipation. Ask your healthcare provider how to prevent or treat constipation.       Take your medicine as directed. Contact your healthcare provider if you think your medicine is not helping or if you have side effects. Tell him of her if you are allergic to any medicine. Keep a list of the medicines, vitamins, and herbs you take. Include the amounts, and when and why you take them. Bring the list or the pill bottles to follow-up visits. Carry your medicine list with you in case of an emergency.    Self-care:     Rinse your mouth every 2 hours with salt water. This will help keep the area clean.       Gently brush your teeth twice a day with a soft tooth brush. This will help keep the area clean.       Eat soft foods as directed. Soft foods may cause less pain. Examples include applesauce, yogurt, and cooked pasta. Ask your healthcare provider how long to follow this instruction.       Apply a warm compress to your tooth or gum. Use a cotton ball or gauze soaked in warm water. Remove the compress in 10 minutes or when it becomes cool. Repeat 3 times a day.     Prevent another abscess:     Brush your teeth at least 2 times a day with fluoride toothpaste.      Use dental floss to clean between your teeth at least once a day.      Rinse your mouth with water or mouthwash after meals and snacks.       Chew sugarless gum after meals and snacks.      Limit foods that are sticky and high in sugar such as raisons. Also limit drinks high in sugar, such as soda.       See your dentist every 6 months for dental cleanings and oral exams.    Follow up with your healthcare provider in 24 hours: Your healthcare provider will need to check your teeth and gums. Write down your questions so you remember to ask them during your visits.        © Copyright [employer] 2019 All illustrations and images included in CareNotes are the copyrighted property of [employer] Inc[employer] or [employer].

## 2021-06-11 NOTE — ED PROVIDER NOTE - CLINICAL SUMMARY MEDICAL DECISION MAKING FREE TEXT BOX
Patient is a good candidate to attempt outpatient management. Supportive care and home care discussed in detail. Patient aware they may have to return for re-evaluation and possible admission if outpatient treatment fails. Strict return precautions discussed.  copy of results provided, pt comfortable and would like to go home.

## 2021-06-11 NOTE — ED PROVIDER NOTE - NS ED ROS FT
CONST: No fever, chills or bodyaches  EYES: No pain, redness, drainage or visual changes.  ENT: No ear pain or discharge, nasal discharge or congestion. No sore throat. (+) left facial swelling  CARD: No chest pain, palpitations  RESP: No SOB, cough, hemoptysis. No hx of asthma or COPD  GI: No abdominal pain, N/V/D  : No urinary symptoms  MS: No joint pain, back pain or extremity pain/injury  SKIN: No rashes  NEURO: No headache, dizziness, paresthesias or LOC

## 2021-06-11 NOTE — ED ADULT NURSE NOTE - PMH
Afib  since jan 2019  CAD (coronary artery disease) of bypass graft  at age 25  Heart disease  TRIPLE BYPASS  High cholesterol    Hypothyroid    Obesity    Renal carcinoma

## 2021-06-11 NOTE — ED PROVIDER NOTE - PATIENT PORTAL LINK FT
You can access the FollowMyHealth Patient Portal offered by [facility] by registering at the following website: [email or web address]. By joining [employer]s FollowMyHealth portal, you will also be able to view your health information using other applications (apps) compatible with our system.

## 2021-06-11 NOTE — ED PROVIDER NOTE - PROGRESS NOTE DETAILS
FF: spoke with dental, will consult ED Attending ARIE Disla  pt does not want anything for pain ATTENDING NOTE: I personally evaluated the patient. I reviewed the Physician Assistant’s note (as assigned above), and agree with the findings and plan except as documented in my note.   57 y/o F with PMH of Afib on Eliquis, CAD s/p stents placed in September, hypothyroidism, and HLD p/w L facial swelling that began this AM and worsened throughout the day. [place] [facility] and sent to ED for further evaluation and possible CT. Denies fever, chills, n/v, cp, sob, pleuritic chest pain, palpitations, diaphoresis, cough, dental pain, drooling/secretions, trismus, neck swelling, neck stiffness, muffled/change in voice, dysphagia, odonoyphagia, drainage, trauma, weakness, numbness/tingling, sick contacts, recent travel or rash. Former smoker.   on exam: wdwn female sitting on stretcher speaking full sentences, no sniffing position, no muffled or hot potatoe voice, no rash, (+) L buckle swelling, mmm, (+) multiple dental carries, L upper teeth decayed with pain to palpation of l upper gum, no pain to palpation of teeth,  no surrounding fluctuance/induration, no halitosis, no trismus, no drooling/secretions, no pain to floor of mouth, no elevation to floor of mouth, no oropharygeal edema, uvula midline, no neck swelling- supple, non-tender, RRR, radial pulses 2/4 b/l, ctabl w/ breath sounds present b/l, no wheezing or crackles, good air exchange, good respiratory effort, no accessory muscle use, no tachypnea, no stridor,  AAOx3.  Plan for dental consult, reassess. ATTENDING NOTE: I personally evaluated the patient. I reviewed the Physician Assistant’s note (as assigned above), and agree with the findings and plan except as documented in my note.   55 y/o F with PMH of hypothyroidism, htn, hld, renal carcinoma s/p r nephrectomy, Afib on Eliquis, CAD s/p stents placed in September, p/w L facial swelling that began this AM and worsened throughout the day. [place] [facility] and sent to ED for further evaluation and possible CT. Denies fever, chills, n/v, cp, sob, pleuritic chest pain, palpitations, diaphoresis, cough, dental pain, drooling/secretions, trismus, neck swelling, neck stiffness, muffled/change in voice, dysphagia, odonoyphagia, drainage, trauma, weakness, numbness/tingling, sick contacts, recent travel or rash. Former smoker.   on exam: wdwn female sitting on stretcher speaking full sentences, no sniffing position, no muffled or hot potatoe voice, no rash, (+) L buckle swelling, mmm, (+) multiple dental carries, L upper teeth decayed with pain to palpation of l upper gum, L upper inner gum and buccal area with induration and fluctuance concern for absess, no halitosis, no trismus, no drooling/secretions, no pain to floor of mouth, no elevation to floor of mouth, no oropharygeal edema, uvula midline, no neck swelling- supple, non-tender, RRR, radial pulses 2/4 b/l, ctabl w/ breath sounds present b/l, no wheezing or crackles, good air exchange, good respiratory effort, no accessory muscle use, no tachypnea, no stridor,  AAOx3. CN II - XII intact, no facial droop or slurring of speech, no focal deficits.  Plan for dental consult, reassess. ED Attending ARIE Disla  dental requesting labs and imaging. ED Attending ARIE Disla  Pt with white count 10.74, pt aware of current results, no drooling/secretions, pending imaging and rest of results. ED Attending ARIE Disla   pt aware of all results, ct showing :"Left periapical abscess adjacent to the left masseter muscle and anterior to the left parotid gland with surrounding soft tissues indicate inflammatory changes.", dental report pt can be discharged on po abx of amoxacillin 500 mg, follow up with her dentist, reports she goes to [facility], if unable ot get appointment return to ed for dental evaluation, pt comfortable and agrees with plan, would like to go home, aware of symptomatic treatment, abx, signs and symptoms to return for, follow up with dental, copy of results provided, swelling has not worsened, speaking full sentences, in nad, tolerating po, will follow up.

## 2021-06-12 VITALS
OXYGEN SATURATION: 99 % | TEMPERATURE: 98 F | RESPIRATION RATE: 18 BRPM | SYSTOLIC BLOOD PRESSURE: 146 MMHG | HEART RATE: 70 BPM | DIASTOLIC BLOOD PRESSURE: 75 MMHG

## 2021-06-12 PROBLEM — C64.9 MALIGNANT NEOPLASM OF UNSPECIFIED KIDNEY, EXCEPT RENAL PELVIS: Chronic | Status: ACTIVE | Noted: 2021-06-07

## 2021-06-12 RX ORDER — IBUPROFEN 200 MG
1 TABLET ORAL
Qty: 40 | Refills: 0
Start: 2021-06-12 | End: 2021-06-21

## 2021-06-12 RX ORDER — OXYCODONE AND ACETAMINOPHEN 5; 325 MG/1; MG/1
1 TABLET ORAL ONCE
Refills: 0 | Status: DISCONTINUED | OUTPATIENT
Start: 2021-06-12 | End: 2021-06-12

## 2021-06-12 RX ADMIN — OXYCODONE AND ACETAMINOPHEN 1 TABLET(S): 5; 325 TABLET ORAL at 03:02

## 2021-06-12 NOTE — CONSULT NOTE ADULT - SUBJECTIVE AND OBJECTIVE BOX
Patient is a 56y old  Female who presents with a chief complaint of left facial swelling.    HPI: Pt reports left side facial swelling that began earlier this afternoon.      PAST MEDICAL & SURGICAL HISTORY:  Heart disease  TRIPLE BYPASS    High cholesterol    Hypothyroid    CAD (coronary artery disease) of bypass graft  at age 25    Obesity    Afib  since jan 2019    Renal carcinoma    CAD (coronary artery disease) of bypass graft  1995    Presence of stent in coronary artery  x2    H/O right nephrectomy      MEDICATIONS  (STANDING):  Apixaban  Eliquis      Allergies  NKDA        FAMILY HISTORY:  Family history of lung cancer (Uncle)        *Last Dental Visit: 2 years ago    Vital Signs Last 24 Hrs  T(C): 36.4 (11 Jun 2021 23:16), Max: 36.7 (11 Jun 2021 17:31)  T(F): 97.5 (11 Jun 2021 23:16), Max: 98.1 (11 Jun 2021 17:31)  HR: 78 (11 Jun 2021 23:16) (78 - 92)  BP: 153/77 (11 Jun 2021 23:16) (153/77 - 181/96)  BP(mean): --  RR: 18 (11 Jun 2021 23:16) (18 - 20)  SpO2: 99% (11 Jun 2021 23:16) (99% - 100%)    LABS:                        12.7   10.74 )-----------( 345      ( 11 Jun 2021 19:58 )             41.2     06-11    141  |  107  |  23<H>  ----------------------------<  122<H>  4.8   |  24  |  1.0    Ca    9.4      11 Jun 2021 19:58    TPro  7.1  /  Alb  3.9  /  TBili  0.2  /  DBili  x   /  AST  23  /  ALT  13  /  AlkPhos  81  06-11    Platelet Count - Automated: 345 K/uL [130 - 400] (06-11 @ 19:58)  WBC Count: 10.74 K/uL [4.80 - 10.80] (06-11 @ 19:58)      -------------------------------------------------------------------    EOE: Left side facial swelling, tender to palpation. Maximum opening WNL. No erythema observed. Submandibular gland findings unremarkable, non swollen, nontender to palpation.  IOE: Permanent dentition. Left side buccal mucosa fluctuance noted facial to upper premolar region. No abscess, raised lesion, or erythema observed intraorally.   A/P: IV antibiotics, CT maxillofacial requested, treatment pending impression.  S: Pt reports no SOB or difficulty swallowing. Has [employer] dental insurance, sees pvt dentist in [place]. Reports improvement to swelling and pain since administration of IV antibiotics. Further eval pending CT max impression.    RECOMMENDATIONS:  2) Dental F/U with outpatient dentist for comprehensive dental care.   3) If any difficulty swallowing/breathing, fever occur, return to ER.     Isacc Estrada DDS [phone] Patient is a 56y old  Female who presents with a chief complaint of left facial swelling.    HPI: Pt reports left side facial swelling that began earlier this afternoon.      PAST MEDICAL & SURGICAL HISTORY:  Heart disease  TRIPLE BYPASS    High cholesterol    Hypothyroid    CAD (coronary artery disease) of bypass graft  at age 25    Obesity    Afib  since jan 2019    Renal carcinoma    CAD (coronary artery disease) of bypass graft  1995    Presence of stent in coronary artery  x2    H/O right nephrectomy      MEDICATIONS  (STANDING):  Apixaban  Eliquis      Allergies  NKDA        FAMILY HISTORY:  Family history of lung cancer (Uncle)        *Last Dental Visit: 2 years ago    Vital Signs Last 24 Hrs  T(C): 36.4 (11 Jun 2021 23:16), Max: 36.7 (11 Jun 2021 17:31)  T(F): 97.5 (11 Jun 2021 23:16), Max: 98.1 (11 Jun 2021 17:31)  HR: 78 (11 Jun 2021 23:16) (78 - 92)  BP: 153/77 (11 Jun 2021 23:16) (153/77 - 181/96)  BP(mean): --  RR: 18 (11 Jun 2021 23:16) (18 - 20)  SpO2: 99% (11 Jun 2021 23:16) (99% - 100%)    LABS:                        12.7   10.74 )-----------( 345      ( 11 Jun 2021 19:58 )             41.2     06-11    141  |  107  |  23<H>  ----------------------------<  122<H>  4.8   |  24  |  1.0    Ca    9.4      11 Jun 2021 19:58    TPro  7.1  /  Alb  3.9  /  TBili  0.2  /  DBili  x   /  AST  23  /  ALT  13  /  AlkPhos  81  06-11    Platelet Count - Automated: 345 K/uL [130 - 400] (06-11 @ 19:58)  WBC Count: 10.74 K/uL [4.80 - 10.80] (06-11 @ 19:58)      -------------------------------------------------------------------    CT maxillofacial    FINDINGS:    Collection anterolateral to the left masseter and anterior to the left parotid gland measuring approximately 3.2 x 1.4 cm. Additional soft tissue inflammatory changes within the subcutaneous fat. Mucosal thickening within the left maxillary sinus, likely reactive.    Right maxillary, ethmoid, frontal and sphenoid sinuses are patent without significant mucosal thickening. The superior, middle and inferior turbinates are unremarkable bilaterally. The nasal septum is midline.    The temporomandibular joints demonstrate no dislocation or degenerative change bilaterally.    The orbits, optic nerves and extraocular muscles are unremarkable bilaterally. No fractures to the orbital walls.      IMPRESSION:    Left periapical abscess adjacent to the left masseter muscle and anterior to the left parotid gland with surrounding soft tissues indicate inflammatory changes.    -------------------------------------------------------------------    EOE: Left side facial swelling, tender to palpation. Maximum opening WNL. No erythema observed. Submandibular gland findings unremarkable, non swollen, nontender to palpation.  IOE: Permanent dentition. Left side buccal mucosa fluctuance noted facial to upper premolar region. No abscess, raised lesion, or erythema observed intraorally.   A/P: Oral antibiotics and pain med per ED. Recommend returning to pvt dentist [facility] for exam, radiographs, and treatment. If unable to schedule emergency appointment with pvt dentist, return to [facility] dental 1F Monday at 9AM for walk-in appointment.  S: Pt reports no SOB or difficulty swallowing. Has [employer] dental insurance, sees pvt dentist in [place]. Reports improvement to swelling and pain since administration of IV antibiotics.    RECOMMENDATIONS:  1) Dental F/U with outpatient dentist for comprehensive dental care. If unable, return to [facility] dental 1F on Monday 9AM.  2) If any difficulty swallowing/breathing, fever occur, return to ER.     Isacc Estrada DDS [phone]

## 2021-06-14 ENCOUNTER — OUTPATIENT (OUTPATIENT)
Dept: OUTPATIENT SERVICES | Facility: HOSPITAL | Age: 56
LOS: 1 days | Discharge: HOME | End: 2021-06-14

## 2021-06-14 DIAGNOSIS — I25.810 ATHEROSCLEROSIS OF CORONARY ARTERY BYPASS GRAFT(S) WITHOUT ANGINA PECTORIS: Chronic | ICD-10-CM

## 2021-06-14 DIAGNOSIS — Z95.5 PRESENCE OF CORONARY ANGIOPLASTY IMPLANT AND GRAFT: Chronic | ICD-10-CM

## 2021-06-14 DIAGNOSIS — Z90.5 ACQUIRED ABSENCE OF KIDNEY: Chronic | ICD-10-CM

## 2021-06-15 DIAGNOSIS — K02.63 DENTAL CARIES ON SMOOTH SURFACE PENETRATING INTO PULP: ICD-10-CM

## 2021-06-25 ENCOUNTER — LABORATORY RESULT (OUTPATIENT)
Age: 56
End: 2021-06-25

## 2021-06-25 ENCOUNTER — OUTPATIENT (OUTPATIENT)
Dept: OUTPATIENT SERVICES | Facility: HOSPITAL | Age: 56
LOS: 1 days | Discharge: HOME | End: 2021-06-25

## 2021-06-25 DIAGNOSIS — Z11.59 ENCOUNTER FOR SCREENING FOR OTHER VIRAL DISEASES: ICD-10-CM

## 2021-06-25 DIAGNOSIS — Z90.5 ACQUIRED ABSENCE OF KIDNEY: Chronic | ICD-10-CM

## 2021-06-25 DIAGNOSIS — I25.810 ATHEROSCLEROSIS OF CORONARY ARTERY BYPASS GRAFT(S) WITHOUT ANGINA PECTORIS: Chronic | ICD-10-CM

## 2021-06-25 DIAGNOSIS — Z95.5 PRESENCE OF CORONARY ANGIOPLASTY IMPLANT AND GRAFT: Chronic | ICD-10-CM

## 2021-06-28 ENCOUNTER — APPOINTMENT (OUTPATIENT)
Dept: SURGERY | Facility: AMBULATORY SURGERY CENTER | Age: 56
End: 2021-06-28
Payer: COMMERCIAL

## 2021-06-28 ENCOUNTER — OUTPATIENT (OUTPATIENT)
Dept: OUTPATIENT SERVICES | Facility: HOSPITAL | Age: 56
LOS: 1 days | Discharge: HOME | End: 2021-06-28

## 2021-06-28 VITALS
OXYGEN SATURATION: 100 % | DIASTOLIC BLOOD PRESSURE: 82 MMHG | SYSTOLIC BLOOD PRESSURE: 134 MMHG | HEART RATE: 76 BPM | RESPIRATION RATE: 17 BRPM

## 2021-06-28 VITALS
TEMPERATURE: 98 F | RESPIRATION RATE: 18 BRPM | DIASTOLIC BLOOD PRESSURE: 82 MMHG | OXYGEN SATURATION: 99 % | HEART RATE: 72 BPM | WEIGHT: 281.97 LBS | SYSTOLIC BLOOD PRESSURE: 133 MMHG | HEIGHT: 66 IN

## 2021-06-28 DIAGNOSIS — Z90.5 ACQUIRED ABSENCE OF KIDNEY: Chronic | ICD-10-CM

## 2021-06-28 DIAGNOSIS — Z95.5 PRESENCE OF CORONARY ANGIOPLASTY IMPLANT AND GRAFT: Chronic | ICD-10-CM

## 2021-06-28 DIAGNOSIS — I25.810 ATHEROSCLEROSIS OF CORONARY ARTERY BYPASS GRAFT(S) WITHOUT ANGINA PECTORIS: Chronic | ICD-10-CM

## 2021-06-28 PROCEDURE — 49568: CPT

## 2021-06-28 PROCEDURE — 49561: CPT

## 2021-06-28 PROCEDURE — 49560: CPT | Mod: XS

## 2021-06-28 RX ORDER — TRAMADOL HYDROCHLORIDE 50 MG/1
1 TABLET ORAL
Qty: 30 | Refills: 0
Start: 2021-06-28 | End: 2021-07-02

## 2021-06-28 RX ORDER — PRASUGREL 5 MG/1
1 TABLET, FILM COATED ORAL
Qty: 0 | Refills: 0 | DISCHARGE

## 2021-06-28 RX ORDER — ONDANSETRON 8 MG/1
4 TABLET, FILM COATED ORAL ONCE
Refills: 0 | Status: DISCONTINUED | OUTPATIENT
Start: 2021-06-28 | End: 2021-07-12

## 2021-06-28 RX ORDER — LEVOTHYROXINE SODIUM 125 MCG
1 TABLET ORAL
Qty: 0 | Refills: 0 | DISCHARGE

## 2021-06-28 RX ORDER — HYDROMORPHONE HYDROCHLORIDE 2 MG/ML
0.5 INJECTION INTRAMUSCULAR; INTRAVENOUS; SUBCUTANEOUS
Refills: 0 | Status: DISCONTINUED | OUTPATIENT
Start: 2021-06-28 | End: 2021-06-28

## 2021-06-28 RX ORDER — SIMVASTATIN 20 MG/1
1 TABLET, FILM COATED ORAL
Qty: 0 | Refills: 0 | DISCHARGE

## 2021-06-28 RX ORDER — SODIUM CHLORIDE 9 MG/ML
1000 INJECTION, SOLUTION INTRAVENOUS
Refills: 0 | Status: DISCONTINUED | OUTPATIENT
Start: 2021-06-28 | End: 2021-07-12

## 2021-06-28 RX ADMIN — SODIUM CHLORIDE 100 MILLILITER(S): 9 INJECTION, SOLUTION INTRAVENOUS at 16:50

## 2021-06-28 NOTE — CHART NOTE - NSCHARTNOTEFT_GEN_A_CORE
PACU ANESTHESIA ADMISSION NOTE      Procedure: Repair of incarcerated incisional hernia using mesh    Repair of incisional hernia with mesh      Post op diagnosis: 1) Incarcerated incisional hernia                                2) Incisional hernia    _X___  Patent Airway    _X___  Full return of protective reflexes    _X___  Full recovery from anesthesia / back to baseline     Vitals:   T:    97.6       R:       22           BP:    127/68              Sat:        100%           P: 55      Mental Status:  _X___ Awake   _____ Alert   _____ Drowsy   _____ Sedated    Nausea/Vomiting:  _X___ NO  ______Yes,   See Post - Op Orders          Pain Scale (0-10):  _____    Treatment: _X___ None    ____ See Post - Op/PCA Orders    Post - Operative Fluids:   ____ Oral   _X___ See Post - Op Orders    Plan: Discharge:   _X___Home       _____Floor     _____Critical Care    _____  Other:_________________    Comments:  No intraoperative complications; hemodynamically stable in PACU (bradycardia treated with Robinul 0.4mg IV x 1 with resolution). Patient to be discharged to home with assist when PACU Susan criteria met for discharge.

## 2021-06-28 NOTE — PRE-ANESTHESIA EVALUATION ADULT - NSANTHADDINFOFT_GEN_ALL_CORE
The patient tolerated exercise session well with no complaints    
Procedure/Risks explained for moderate/deep sedation + routine monitoring; general anesthesia as rescue anesthetic. Patient understands the stated anesthetic plan and agrees to proceed.

## 2021-06-28 NOTE — BRIEF OPERATIVE NOTE - NSICDXBRIEFPREOP_GEN_ALL_CORE_FT
PRE-OP DIAGNOSIS:  Incarcerated incisional hernia 28-Jun-2021 16:20:03  Toribio Denny  Incisional hernia 28-Jun-2021 16:20:04  Toribio Denny

## 2021-06-28 NOTE — BRIEF OPERATIVE NOTE - NSICDXBRIEFPROCEDURE_GEN_ALL_CORE_FT
PROCEDURES:  Repair of incarcerated incisional hernia using mesh 28-Jun-2021 16:20:14  Toribio Denny  Repair of incisional hernia with mesh 28-Jun-2021 16:20:16  Toribio Denny

## 2021-06-28 NOTE — BRIEF OPERATIVE NOTE - NSICDXBRIEFPOSTOP_GEN_ALL_CORE_FT
POST-OP DIAGNOSIS:  Incarcerated incisional hernia 28-Jun-2021 16:20:09  Toribio Denny  Incisional hernia 28-Jun-2021 16:20:10  Toribio Denny

## 2021-06-28 NOTE — ASU DISCHARGE PLAN (ADULT/PEDIATRIC) - CARE PROVIDER_API CALL
Toribio Denny)  Surgery  [address]  [place] [address]  Phone: [phone]  Fax: [phone]  Scheduled Appointment: 07/08/2021

## 2021-07-02 DIAGNOSIS — Z95.5 PRESENCE OF CORONARY ANGIOPLASTY IMPLANT AND GRAFT: ICD-10-CM

## 2021-07-02 DIAGNOSIS — Z79.01 LONG TERM (CURRENT) USE OF ANTICOAGULANTS: ICD-10-CM

## 2021-07-02 DIAGNOSIS — Z95.1 PRESENCE OF AORTOCORONARY BYPASS GRAFT: ICD-10-CM

## 2021-07-02 DIAGNOSIS — K43.0 INCISIONAL HERNIA WITH OBSTRUCTION, WITHOUT GANGRENE: ICD-10-CM

## 2021-07-02 DIAGNOSIS — I48.91 UNSPECIFIED ATRIAL FIBRILLATION: ICD-10-CM

## 2021-07-02 DIAGNOSIS — Z87.891 PERSONAL HISTORY OF NICOTINE DEPENDENCE: ICD-10-CM

## 2021-07-02 DIAGNOSIS — E78.00 PURE HYPERCHOLESTEROLEMIA, UNSPECIFIED: ICD-10-CM

## 2021-07-02 DIAGNOSIS — I25.10 ATHEROSCLEROTIC HEART DISEASE OF NATIVE CORONARY ARTERY WITHOUT ANGINA PECTORIS: ICD-10-CM

## 2021-07-02 DIAGNOSIS — K43.2 INCISIONAL HERNIA WITHOUT OBSTRUCTION OR GANGRENE: ICD-10-CM

## 2021-07-02 DIAGNOSIS — E66.9 OBESITY, UNSPECIFIED: ICD-10-CM

## 2021-07-02 DIAGNOSIS — Z90.5 ACQUIRED ABSENCE OF KIDNEY: ICD-10-CM

## 2021-07-02 DIAGNOSIS — Z85.528 PERSONAL HISTORY OF OTHER MALIGNANT NEOPLASM OF KIDNEY: ICD-10-CM

## 2021-07-02 DIAGNOSIS — N73.6 FEMALE PELVIC PERITONEAL ADHESIONS (POSTINFECTIVE): ICD-10-CM

## 2021-07-02 DIAGNOSIS — E03.9 HYPOTHYROIDISM, UNSPECIFIED: ICD-10-CM

## 2021-07-08 ENCOUNTER — APPOINTMENT (OUTPATIENT)
Dept: SURGERY | Facility: CLINIC | Age: 56
End: 2021-07-08
Payer: COMMERCIAL

## 2021-07-08 DIAGNOSIS — E66.01 MORBID (SEVERE) OBESITY DUE TO EXCESS CALORIES: ICD-10-CM

## 2021-07-08 DIAGNOSIS — K43.0 INCISIONAL HERNIA WITH OBSTRUCTION, W/OUT GANGRENE: ICD-10-CM

## 2021-07-08 PROCEDURE — 99024 POSTOP FOLLOW-UP VISIT: CPT

## 2021-07-08 NOTE — CONSULT LETTER
[FreeTextEntry1] : Dear Dr. Alejandro Tipton, \par \par I had the pleasure of seeing your patient, RENZO ORELLANA, in my office today. Please see my note below. \par \par Thank you very much for allowing me to participate in the care of this patient. If you have any questions, please do not hesitate to contact me. \par \par \par Respectfully,\par \par Toribio Denny M.D., FACS\par  \par \par \par \par cc: Dr. Tulio Guerra \par Dr. Javier Roth

## 2021-07-15 ENCOUNTER — RESULT REVIEW (OUTPATIENT)
Age: 56
End: 2021-07-15

## 2021-08-16 ENCOUNTER — NON-APPOINTMENT (OUTPATIENT)
Age: 56
End: 2021-08-16

## 2021-08-16 ENCOUNTER — OUTPATIENT (OUTPATIENT)
Dept: OUTPATIENT SERVICES | Facility: HOSPITAL | Age: 56
LOS: 1 days | Discharge: HOME | End: 2021-08-16
Payer: COMMERCIAL

## 2021-08-16 DIAGNOSIS — C64.9 MALIGNANT NEOPLASM OF UNSPECIFIED KIDNEY, EXCEPT RENAL PELVIS: ICD-10-CM

## 2021-08-16 DIAGNOSIS — Z90.5 ACQUIRED ABSENCE OF KIDNEY: Chronic | ICD-10-CM

## 2021-08-16 DIAGNOSIS — Z95.5 PRESENCE OF CORONARY ANGIOPLASTY IMPLANT AND GRAFT: Chronic | ICD-10-CM

## 2021-08-16 DIAGNOSIS — I25.810 ATHEROSCLEROSIS OF CORONARY ARTERY BYPASS GRAFT(S) WITHOUT ANGINA PECTORIS: Chronic | ICD-10-CM

## 2021-08-16 PROCEDURE — 71046 X-RAY EXAM CHEST 2 VIEWS: CPT | Mod: 26

## 2021-08-16 PROCEDURE — 76775 US EXAM ABDO BACK WALL LIM: CPT | Mod: 26

## 2021-08-30 ENCOUNTER — APPOINTMENT (OUTPATIENT)
Dept: UROLOGY | Facility: CLINIC | Age: 56
End: 2021-08-30
Payer: COMMERCIAL

## 2021-08-30 DIAGNOSIS — K43.2 INCISIONAL HERNIA W/OUT OBSTRUCTION OR GANGRENE: ICD-10-CM

## 2021-08-30 PROCEDURE — 99214 OFFICE O/P EST MOD 30 MIN: CPT | Mod: 95

## 2021-08-30 NOTE — ASSESSMENT
[FreeTextEntry1] : RENZO ORELLANA is a 56 year old female sp right robotic radical nephrectomy for a 7 cm tumor, path T1b (7cm) ccRCC FG2, foci of cystic/myxoid changes, neg margins, one hilar lymph node neg 3/2019.\par CT showed new pulmonary nodules concerning for metastatic disease now resolved, was likely secondary to covid19 sp ventral hernia repair.\par \par Doing well. ALEXANDER\par fu 12 mo us abdomen, cxr, cmp\par

## 2021-08-30 NOTE — HISTORY OF PRESENT ILLNESS
[Home] : at home, [unfilled] , at the time of the visit. 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 denies flank pain hematuria\par \par 8/2021: \par CMP: \par eGFR= 72 // Creat=0.9 // \par \par US retroperitoneal from 8/2021 images visualized show: Status post right nephrectomy. No left-sided hydronephrosis.\par \par Chest x ray from 8/2021: Stable exam, no radiographic evidence of acute cardiopulmonary disease.\par \par Previously: \par CT chest abdomen pelvis images visualized from December 2020 demonstrating no evidence of metastatic disease right kidney is unremarkable.  Ventral hernia visible.\par Cr 1.09  12/2020\par eGFR 57\par \par Previously Of note ~1/2019 pt was first dx w influeneza, CXR revealed a hilar cavitary mass and chest CT showed --1. Extensive mediastinal, bilateral hilar, and right supraclavicular \par lymphadenopathy \par She had a follow up PET scan approx 3 weeks later which was neg for any metastatic disease in the C/A/P.\par

## 2022-02-17 NOTE — CONSULT LETTER
[facility]    Sepsis Reassessment Note    /70   Pulse 113   Temp (!) 102.5 °F (39.2 °C) (Rectal)   Resp (!) 37   Ht 167.6 cm (5' 6\")   Wt 68 kg   SpO2 98%   BMI 24.21 kg/m²      11:39 AM    Cardiac:  Regularity: Regular  Rate: Tachycardic  He [FreeTextEntry1] : Dear Dr. Alejandro Tipton, \par \par I had the pleasure of seeing your patient, RENZO ORELLANA, in my office today. Please see my note below. \par \par Thank you very much for allowing me to participate in the care of this patient. If you have any questions, please do not hesitate to contact me. \par \par \par Respectfully,\par \par Toribio Denny M.D., FACS\par  \par \par \par \par cc: Dr. Tulio Guerra \par Dr. Javier Roth  Dupixent Pregnancy And Lactation Text: This medication likely crosses the placenta but the risk for the fetus is uncertain. This medication is excreted in breast milk.

## 2022-09-08 ENCOUNTER — OUTPATIENT (OUTPATIENT)
Dept: OUTPATIENT SERVICES | Facility: HOSPITAL | Age: 57
LOS: 1 days | Discharge: HOME | End: 2022-09-08

## 2022-09-08 DIAGNOSIS — Z12.31 ENCOUNTER FOR SCREENING MAMMOGRAM FOR MALIGNANT NEOPLASM OF BREAST: ICD-10-CM

## 2022-09-08 DIAGNOSIS — Z95.5 PRESENCE OF CORONARY ANGIOPLASTY IMPLANT AND GRAFT: Chronic | ICD-10-CM

## 2022-09-08 DIAGNOSIS — Z90.5 ACQUIRED ABSENCE OF KIDNEY: Chronic | ICD-10-CM

## 2022-09-08 DIAGNOSIS — I25.810 ATHEROSCLEROSIS OF CORONARY ARTERY BYPASS GRAFT(S) WITHOUT ANGINA PECTORIS: Chronic | ICD-10-CM

## 2022-09-08 PROCEDURE — 77067 SCR MAMMO BI INCL CAD: CPT | Mod: 26

## 2022-09-08 PROCEDURE — 77063 BREAST TOMOSYNTHESIS BI: CPT | Mod: 26

## 2022-09-12 ENCOUNTER — APPOINTMENT (OUTPATIENT)
Dept: ORTHOPEDIC SURGERY | Facility: CLINIC | Age: 57
End: 2022-09-12

## 2022-09-12 VITALS — WEIGHT: 280 LBS | BODY MASS INDEX: 45 KG/M2 | HEIGHT: 66 IN

## 2022-09-12 PROCEDURE — 99203 OFFICE O/P NEW LOW 30 MIN: CPT | Mod: 25

## 2022-09-12 PROCEDURE — 73562 X-RAY EXAM OF KNEE 3: CPT | Mod: 26,RT

## 2022-09-12 PROCEDURE — 20610 DRAIN/INJ JOINT/BURSA W/O US: CPT | Mod: RT

## 2022-09-12 NOTE — DATA REVIEWED
[Right] : of the right [Knee] : knee [FreeTextEntry1] : X-ray: severe osteoarthritis with medial joint line narrowing, bone-on-bone, patellofemoral osteoarthritis.  No acute fractures, subluxations, dislocations

## 2022-09-12 NOTE — PHYSICAL EXAM
[Right] : right knee [Positive] : positive Haylee [] : no extensor lag [FreeTextEntry9] :  full flexion-extension with medial joint line pain [de-identified] :  good strength throughout

## 2022-09-12 NOTE — HISTORY OF PRESENT ILLNESS
[de-identified] : Patient is a 57-year-old female here for right knee pain.  Patient states she has been having pain in the right knee for about 3 weeks with no trauma or injury.  Patient states she has been treated for arthritis in the right knee in the past, patient  does not remember how she was treated  or who treated her.  Patient denies numbness and tingling, denies knee buckling. patient states she has pain when standing or periods time, going up and down stairs,  walking long distance.

## 2022-09-12 NOTE — DISCUSSION/SUMMARY
[de-identified] :  discussed x-rays in detail patient showing severe osteoarthritis.  Discussed treatment options in detail patient cleaning rest, weight loss, range-of-motion exercises, Tylenol, range-of-motion exercise, physical therapy, cortisone injection, gel injection.  Patient agreed to cortisone injection.\par \par Kenalog and Lidocaine  \par \par Anesthesia: ethyl chloride sprayed topically.. Kenalog 1 cc.  \par \par Lidocaine 1%: 2 cc.  \par \par   \par \par Medication was injected in the right knee after verbal consent using sterile preparation and technique. The risks, benefits, and alternatives to cortisone injection were explained in full to the patient. Risks outlined include but are not limited to infection, sepsis, bleeding, scarring, skin discoloration, temporary increase in pain, syncopal episode, failure to resolve symptoms, allergic reaction, symptom recurrence, and elevation of blood sugar in diabetics. Patient understood the risks. All questions were answered. After discussion of options, patient requested an injection. Oral informed consent was obtained and sterile prep was done of the injection site. Sterile technique was utilized for the procedure including the preparation of the solutions used for the injection. Patient tolerated the procedure well. Advised to ice the injection site this evening. \par \par \par   Follow-up in 8 weeks for re-evaluation.  Call if any questions or concerns.  Patient understands agrees with plan.\par \par \par

## 2022-09-19 ENCOUNTER — OUTPATIENT (OUTPATIENT)
Dept: OUTPATIENT SERVICES | Facility: HOSPITAL | Age: 57
LOS: 1 days | Discharge: HOME | End: 2022-09-19

## 2022-09-19 ENCOUNTER — NON-APPOINTMENT (OUTPATIENT)
Age: 57
End: 2022-09-19

## 2022-09-19 ENCOUNTER — RESULT REVIEW (OUTPATIENT)
Age: 57
End: 2022-09-19

## 2022-09-19 DIAGNOSIS — Z90.5 ACQUIRED ABSENCE OF KIDNEY: Chronic | ICD-10-CM

## 2022-09-19 DIAGNOSIS — C64.9 MALIGNANT NEOPLASM OF UNSPECIFIED KIDNEY, EXCEPT RENAL PELVIS: ICD-10-CM

## 2022-09-19 DIAGNOSIS — Z95.5 PRESENCE OF CORONARY ANGIOPLASTY IMPLANT AND GRAFT: Chronic | ICD-10-CM

## 2022-09-19 DIAGNOSIS — I25.810 ATHEROSCLEROSIS OF CORONARY ARTERY BYPASS GRAFT(S) WITHOUT ANGINA PECTORIS: Chronic | ICD-10-CM

## 2022-09-19 PROCEDURE — 71046 X-RAY EXAM CHEST 2 VIEWS: CPT | Mod: 26

## 2022-09-19 PROCEDURE — 76700 US EXAM ABDOM COMPLETE: CPT | Mod: 26

## 2022-09-27 NOTE — ED ADULT NURSE NOTE - CHIEF COMPLAINT QUOTE
Cough and shortness of breath since last Sunday. Pt took z-pack with no improvement. 75 yo M with pmhx of HTN, BPH and kidney stone who presents for CABG. Patient with NSTEMI- cath showing multi-vessel disease.

## 2022-11-07 ENCOUNTER — APPOINTMENT (OUTPATIENT)
Dept: ORTHOPEDIC SURGERY | Facility: CLINIC | Age: 57
End: 2022-11-07

## 2022-11-07 PROCEDURE — 99213 OFFICE O/P EST LOW 20 MIN: CPT

## 2022-11-07 RX ORDER — IBUPROFEN 800 MG/1
800 TABLET, FILM COATED ORAL
Qty: 30 | Refills: 0 | Status: ACTIVE | COMMUNITY
Start: 2022-10-29

## 2022-11-07 RX ORDER — AMOXICILLIN AND CLAVULANATE POTASSIUM 500; 125 MG/1; MG/1
500-125 TABLET, FILM COATED ORAL
Qty: 21 | Refills: 0 | Status: ACTIVE | COMMUNITY
Start: 2022-10-28

## 2022-11-07 RX ORDER — APIXABAN 5 MG/1
5 TABLET, FILM COATED ORAL
Qty: 60 | Refills: 0 | Status: ACTIVE | COMMUNITY
Start: 2022-10-28

## 2022-11-07 RX ORDER — LEVOTHYROXINE SODIUM 0.03 MG/1
25 TABLET ORAL
Qty: 90 | Refills: 0 | Status: ACTIVE | COMMUNITY
Start: 2022-10-23

## 2022-11-07 NOTE — HISTORY OF PRESENT ILLNESS
[de-identified] : Patient is a 57-year-old female here for repeat evaluation of osteoarthritis right knee.  During patient's last visit she was given cortisone injection.  Patient states that the cortisone injection did help for a couple months.  Patient states over the past week her pain has been overall coming back.   denies injury/ trauma, denies numbness and tingling, denies instability.

## 2022-11-07 NOTE — DISCUSSION/SUMMARY
[de-identified] :  discussed x-rays in detail patient showing severe osteoarthritis.  Discussed treatment options in detail patient cleaning rest, weight loss, range-of-motion exercises, Tylenol, range-of-motion exercise, physical therapy, cortisone injection, knee replacement surgery.  It is too early for patient to have a repeat cortisone injection.\par \par \par   Follow-up in 8 weeks for re-evaluation,  Possible repeat cortisone injection.  Call if any questions or concerns.  Patient understands agrees with plan. seen under supervision of Dr. Ledezma. \par \par \par

## 2022-11-07 NOTE — PHYSICAL EXAM
[Right] : right knee [Equivocal] : equivocal Haylee [] : non-antalgic [FreeTextEntry9] :  full flexion-extension with medial joint line pain [de-identified] :  good strength throughout

## 2022-11-29 ENCOUNTER — NON-APPOINTMENT (OUTPATIENT)
Age: 57
End: 2022-11-29

## 2023-01-13 ENCOUNTER — APPOINTMENT (OUTPATIENT)
Dept: ORTHOPEDIC SURGERY | Facility: CLINIC | Age: 58
End: 2023-01-13
Payer: COMMERCIAL

## 2023-01-13 PROCEDURE — 20610 DRAIN/INJ JOINT/BURSA W/O US: CPT | Mod: RT

## 2023-01-13 PROCEDURE — 99213 OFFICE O/P EST LOW 20 MIN: CPT | Mod: 25

## 2023-01-13 NOTE — PHYSICAL EXAM
[Right] : right knee [Equivocal] : equivocal Haylee [] : non-antalgic [FreeTextEntry9] :  full flexion-extension with medial joint line pain [de-identified] :  good strength throughout

## 2023-01-13 NOTE — HISTORY OF PRESENT ILLNESS
[de-identified] : Patient is a 57-year-old female here for reevaluation of right knee osteoarthritis.  Patient states that she did have a cortisone injection in the past which did help her pain.  Patient denies recent injury/trauma, numbness/tingling.  Patient states that she has been attempting weight loss and has lost 15 pounds so far.

## 2023-01-13 NOTE — DISCUSSION/SUMMARY
[de-identified] : Discussed prior x-rays with patient showing advanced osteoarthritis of the right knee with bone-on-bone medial joint line narrowing.  Discussed treatment options detail including rest, anti-inflammatories, range of motion exercises, physical therapy, ice/heat, knee sleeve, cortisone injection, total knee replacement surgery.  Patient agreed to cortisone injection right knee.\par \par Dexamethasone and Lidocaine  \par \par Anesthesia: ethyl chloride sprayed topically. Dexamethasone 20mg/5mL 2 cc.  \par \par Lidocaine 1%: 2 cc.  \par \par   \par \par Medication was injected in the right knee after verbal consent using sterile preparation and technique. The risks, benefits, and alternatives to cortisone injection were explained in full to the patient. Risks outlined include but are not limited to infection, sepsis, bleeding, scarring, skin discoloration, temporary increase in pain, syncopal episode, failure to resolve symptoms, allergic reaction, symptom recurrence, and elevation of blood sugar in diabetics. Patient understood the risks. All questions were answered. After discussion of options, patient requested an injection. Oral informed consent was obtained and sterile prep was done of the injection site. Sterile technique was utilized for the procedure including the preparation of the solutions used for the injection. Patient tolerated the procedure well. Advised to ice the injection site this evening. \par \par Patient will follow-up in 4 months for reevaluation.  Call if symptoms worsen or change.  Patient understands agrees with plan.  Seen in supervision of Dr. Anderson.

## 2023-01-17 ENCOUNTER — APPOINTMENT (OUTPATIENT)
Dept: UROLOGY | Facility: CLINIC | Age: 58
End: 2023-01-17
Payer: COMMERCIAL

## 2023-01-17 VITALS
WEIGHT: 280 LBS | SYSTOLIC BLOOD PRESSURE: 160 MMHG | RESPIRATION RATE: 15 BRPM | DIASTOLIC BLOOD PRESSURE: 80 MMHG | BODY MASS INDEX: 45 KG/M2 | HEIGHT: 66 IN | HEART RATE: 56 BPM | TEMPERATURE: 98 F | OXYGEN SATURATION: 99 %

## 2023-01-17 DIAGNOSIS — C64.9 MALIGNANT NEOPLASM OF UNSPECIFIED KIDNEY, EXCEPT RENAL PELVIS: ICD-10-CM

## 2023-01-17 DIAGNOSIS — N28.89 OTHER SPECIFIED DISORDERS OF KIDNEY AND URETER: ICD-10-CM

## 2023-01-17 PROCEDURE — 99214 OFFICE O/P EST MOD 30 MIN: CPT

## 2023-01-17 RX ORDER — AZITHROMYCIN 250 MG/1
250 TABLET, FILM COATED ORAL
Qty: 6 | Refills: 0 | Status: ACTIVE | COMMUNITY
Start: 2022-11-30

## 2023-01-17 RX ORDER — BENZONATATE 200 MG/1
200 CAPSULE ORAL
Qty: 21 | Refills: 0 | Status: ACTIVE | COMMUNITY
Start: 2022-11-30

## 2023-01-17 RX ORDER — PREDNISONE 20 MG/1
20 TABLET ORAL
Qty: 10 | Refills: 0 | Status: ACTIVE | COMMUNITY
Start: 2022-11-30

## 2023-01-20 PROBLEM — N28.89 RENAL MASS: Status: ACTIVE | Noted: 2019-02-22

## 2023-01-20 PROBLEM — C64.9 RENAL CELL CARCINOMA: Status: ACTIVE | Noted: 2019-04-04

## 2023-01-20 NOTE — ASSESSMENT
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 \par \par  Had to delay her September 2022 appointment.  Imaging from September 2022 without evidence of disease recurrence.  Creatinine within normal limits.  Physical examination within normal limits.\par \par Plan:\par -Chest x-ray\par -Renal ultrasound\par cmp\par -Follow-up September 2023\par

## 2023-01-20 NOTE — ADDENDUM
[FreeTextEntry1] : Agree with the above documentation as outlined by the PA which I have addended as necessary.\par The submitted E/M billing level for this visit reflects the total time spent on the day of the visit including face-to-face time spent with the patient, non-face-to-face review of medical records and relevant information, documentation, and asynchronous communication with the patient after a visit via phone, email, or patient’s EHR portal after the visit. \par The medical records reviewed are either scanned into the chart or reviewed with the patient using a patient’s electronic medical records portal for patients with records not available to [facility] via electronic transmission platforms from other institutions and labs. \par Time spend counseling and performing coordination of care was also included in determining the appropriate EM billing level.\par

## 2023-01-20 NOTE — PHYSICAL EXAM
[General Appearance - Well Developed] : well developed [General Appearance - Well Nourished] : well nourished [Normal Appearance] : normal appearance [Well Groomed] : well groomed [General Appearance - In No Acute Distress] : no acute distress [Abdomen Soft] : soft [Abdomen Tenderness] : non-tender [Abdomen Hernia] : no hernia was discovered [Costovertebral Angle Tenderness] : no ~M costovertebral angle tenderness [] : no respiratory distress [Respiration, Rhythm And Depth] : normal respiratory rhythm and effort [Exaggerated Use Of Accessory Muscles For Inspiration] : no accessory muscle use [Oriented To Time, Place, And Person] : oriented to person, place, and time [Affect] : the affect was normal [Mood] : the mood was normal [Not Anxious] : not anxious [Normal Station and Gait] : the gait and station were normal for the patient's age [No Focal Deficits] : no focal deficits [FreeTextEntry1] : Surgical scars well-healed no evidence of herniation

## 2023-01-20 NOTE — HISTORY OF PRESENT ILLNESS
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  Had to delay her September 2022 appointment.\par \par Patient doing well postprocedure and denies any flank pain, hematuria, nausea/vomiting, or changes in urination.\par \par Blood work from 9/26/2022:\par Creatinine 0.96 with an estimated GFR of 69.  Liver function testing within normal limits\par Ultrasound abdomen images visualized from 9/19/2022 demonstrates 4.4 cm hypoechoic left liver lobe lesion, previous identified to be a hemangioma.  Post right nephrectomy.  Findings grossly unchanged.\par Chest x-ray from 9/19/2022 demonstrates mild vascular congestion, unchanged.  No focal infiltrates.\par \par Previously:\par \par 8/2021: \par CMP: \par eGFR= 72 // Creat=0.9 // \par \par US retroperitoneal from 8/2021 images visualized show: Status post right nephrectomy. No left-sided hydronephrosis.\par \par Chest x ray from 8/2021: Stable exam, no radiographic evidence of acute cardiopulmonary disease.\par \par CT chest abdomen pelvis images from December 2020 demonstrating no evidence of metastatic disease right kidney is unremarkable.  Ventral hernia visible.\par Cr 1.09  12/2020\par eGFR 57\par \par Previously Of note ~1/2019 pt was first dx w influeneza, CXR revealed a hilar cavitary mass and chest CT showed --1. Extensive mediastinal, bilateral hilar, and right supraclavicular \par lymphadenopathy \par She had a follow up PET scan approx 3 weeks later which was neg for any metastatic disease in the C/A/P.\par

## 2023-01-20 NOTE — DISCHARGE NOTE PROVIDER - NSCORESITESY/N_GEN_A_CORE_RD
Attending to bill Attending to bill Attending to bill Attending to bill Attending to bill Attending to bill Attending to bill No

## 2023-05-15 ENCOUNTER — APPOINTMENT (OUTPATIENT)
Dept: ORTHOPEDIC SURGERY | Facility: CLINIC | Age: 58
End: 2023-05-15

## 2023-05-23 ENCOUNTER — APPOINTMENT (OUTPATIENT)
Dept: ORTHOPEDIC SURGERY | Facility: CLINIC | Age: 58
End: 2023-05-23
Payer: COMMERCIAL

## 2023-05-23 PROCEDURE — 99213 OFFICE O/P EST LOW 20 MIN: CPT | Mod: 25

## 2023-05-23 PROCEDURE — 20605 DRAIN/INJ JOINT/BURSA W/O US: CPT | Mod: RT

## 2023-05-23 NOTE — PHYSICAL EXAM
[Right] : right knee [Equivocal] : equivocal Haylee [] : non-antalgic [FreeTextEntry9] :  full flexion-extension with medial joint line pain [de-identified] :  good strength throughout

## 2023-05-23 NOTE — HISTORY OF PRESENT ILLNESS
[de-identified] : Patient is a 57-year-old female here for reevaluation of right knee osteoarthritis.  Patient states that she did have a cortisone injection in the past which did help her pain.  Patient denies recent injury/trauma, numbness/tingling.  Patient states that she has been doing with weight loss.

## 2023-05-23 NOTE — DISCUSSION/SUMMARY
[de-identified] : Discussed prior x-rays with patient showing advanced osteoarthritis of the right knee with bone-on-bone medial joint line narrowing.  Discussed treatment options detail including rest, anti-inflammatories, range of motion exercises, physical therapy, ice/heat, knee sleeve, cortisone injection, total knee replacement surgery.  Patient agreed to cortisone injection right knee.\par \par Dexamethasone and Lidocaine  \par \par Anesthesia: ethyl chloride sprayed topically. Dexamethasone 20mg/5mL 2 cc.  \par \par Lidocaine 1%: 2 cc.  \par \par   \par \par Medication was injected in the right knee after verbal consent using sterile preparation and technique. The risks, benefits, and alternatives to cortisone injection were explained in full to the patient. Risks outlined include but are not limited to infection, sepsis, bleeding, scarring, skin discoloration, temporary increase in pain, syncopal episode, failure to resolve symptoms, allergic reaction, symptom recurrence, and elevation of blood sugar in diabetics. Patient understood the risks. All questions were answered. After discussion of options, patient requested an injection. Oral informed consent was obtained and sterile prep was done of the injection site. Sterile technique was utilized for the procedure including the preparation of the solutions used for the injection. Patient tolerated the procedure well. Advised to ice the injection site this evening. \par \par Patient will follow-up in 4 months for reevaluation.  Call if symptoms worsen or change.  Patient understands agrees with plan.  Seen in supervision of Dr. Anderson.

## 2023-09-18 ENCOUNTER — APPOINTMENT (OUTPATIENT)
Dept: UROLOGY | Facility: CLINIC | Age: 58
End: 2023-09-18

## 2023-09-22 ENCOUNTER — APPOINTMENT (OUTPATIENT)
Dept: ORTHOPEDIC SURGERY | Facility: CLINIC | Age: 58
End: 2023-09-22
Payer: COMMERCIAL

## 2023-09-22 PROCEDURE — 20610 DRAIN/INJ JOINT/BURSA W/O US: CPT | Mod: RT

## 2023-09-22 PROCEDURE — 99213 OFFICE O/P EST LOW 20 MIN: CPT | Mod: 25

## 2023-09-22 RX ORDER — HYLAN G-F 20 16MG/2ML
16 SYRINGE (ML) INTRAARTICULAR
Qty: 1 | Refills: 0 | Status: ACTIVE | OUTPATIENT
Start: 2023-09-22

## 2023-11-10 ENCOUNTER — APPOINTMENT (OUTPATIENT)
Dept: ORTHOPEDIC SURGERY | Facility: CLINIC | Age: 58
End: 2023-11-10

## 2023-11-17 ENCOUNTER — APPOINTMENT (OUTPATIENT)
Dept: ORTHOPEDIC SURGERY | Facility: CLINIC | Age: 58
End: 2023-11-17
Payer: COMMERCIAL

## 2023-11-17 PROCEDURE — 20610 DRAIN/INJ JOINT/BURSA W/O US: CPT | Mod: RT

## 2023-11-17 PROCEDURE — 99213 OFFICE O/P EST LOW 20 MIN: CPT | Mod: 25

## 2023-11-24 ENCOUNTER — APPOINTMENT (OUTPATIENT)
Dept: ORTHOPEDIC SURGERY | Facility: CLINIC | Age: 58
End: 2023-11-24

## 2023-11-24 ENCOUNTER — APPOINTMENT (OUTPATIENT)
Dept: ORTHOPEDIC SURGERY | Facility: CLINIC | Age: 58
End: 2023-11-24
Payer: COMMERCIAL

## 2023-11-24 PROCEDURE — 20610 DRAIN/INJ JOINT/BURSA W/O US: CPT | Mod: RT

## 2023-12-01 ENCOUNTER — APPOINTMENT (OUTPATIENT)
Dept: ORTHOPEDIC SURGERY | Facility: CLINIC | Age: 58
End: 2023-12-01
Payer: COMMERCIAL

## 2023-12-01 DIAGNOSIS — M17.11 UNILATERAL PRIMARY OSTEOARTHRITIS, RIGHT KNEE: ICD-10-CM

## 2023-12-01 PROCEDURE — 20610 DRAIN/INJ JOINT/BURSA W/O US: CPT | Mod: RT

## 2024-02-01 NOTE — PATIENT PROFILE ADULT - TOBACCO USE
"Chief Complaint   Patient presents with    Gynecologic Exam     Cc:  3 months follow up on progesterone  - definitely doing better with sleeping , no negative effects .         HPI  Irma Esposito is a 55 y.o. female is scheduled for f/u visit after starting prometrium for sleep disturbance. She notes she is sleeping much better now and desires to continue prometrium.         The following portions of the patient's history were reviewed and updated as appropriate: allergies, current medications, past family history, past medical history, past social history, past surgical history, and problem list.    Review of Systems  Pertinent items are noted in HPI.    /68   Ht 157.5 cm (62\")   Wt 63.5 kg (140 lb)   LMP  (LMP Unknown)   BMI 25.61 kg/m²         Physical Exam  Constitutional:       Appearance: Normal appearance.   Pulmonary:      Effort: Pulmonary effort is normal.   Neurological:      General: No focal deficit present.      Mental Status: She is alert and oriented to person, place, and time.   Psychiatric:         Mood and Affect: Mood normal.         Behavior: Behavior normal.             Diagnoses and all orders for this visit:    1. Symptoms, such as flushing, sleeplessness, headache, lack of concentration, associated with the menopause (Primary)    2. Encounter for screening mammogram for malignant neoplasm of breast  -     Mammo Screening Digital Tomosynthesis Bilateral With CAD; Future    Other orders  -     Progesterone (Prometrium) 100 MG capsule; Take 1 capsule by mouth Every Night.  Dispense: 90 capsule; Refill: 3      Reviewed risks of prometrium including breast and ovarian cancer. Pt desires to continue at this time. Plan f/u prn or for annual.           "
Never smoker

## 2024-06-06 NOTE — ASU PATIENT PROFILE, ADULT - NS PRO MODE OF ARRIVAL
Pt BIBEMS co seizure at work. As per EMS, pt was having a seizure for about less than 10 mins with unknown postictal signs. Pt denies CP, SOB, HA in triage. Pt's parents not present at this time, but cousin is available as guardian. Denies significant PMHx, NKDA, AOx4. STAT EKG in progress. ambulatory

## 2024-09-15 ENCOUNTER — NON-APPOINTMENT (OUTPATIENT)
Age: 59
End: 2024-09-15

## 2024-11-22 ENCOUNTER — APPOINTMENT (OUTPATIENT)
Dept: ORTHOPEDIC SURGERY | Facility: CLINIC | Age: 59
End: 2024-11-22
Payer: COMMERCIAL

## 2024-11-22 DIAGNOSIS — M17.0 BILATERAL PRIMARY OSTEOARTHRITIS OF KNEE: ICD-10-CM

## 2024-11-22 PROCEDURE — 99214 OFFICE O/P EST MOD 30 MIN: CPT | Mod: 25

## 2024-11-22 PROCEDURE — 20610 DRAIN/INJ JOINT/BURSA W/O US: CPT | Mod: 50

## 2024-11-22 PROCEDURE — 73562 X-RAY EXAM OF KNEE 3: CPT | Mod: 50

## 2024-12-13 NOTE — ASU PATIENT PROFILE, ADULT - ABILITY TO HEAR (WITH HEARING AID OR HEARING APPLIANCE IF NORMALLY USED):
A/Ox4, VSS on RA. [place].  VSS on Room Air.  Denies pain post radiation treatment earlier today.  Up A2 + binu steady with PT.   Up with Assist of 2 + GB/W and pivots to BSC.   Two large loose BMs today - thus holding Miralax and Senna.  Not oob this shift, prefers to sit on edge of bed. Refused bed alarm.  Tolerating mechanical soft diet.  Downing in place w/ adequate UOP - draining clear yellow. Reports slight numbness/tingling in feet. K WNL - monitor AM recheck. R PIV SL'd.   
Ice  [id number]  
Adequate: hears normal conversation without difficulty

## 2025-02-07 NOTE — ED PROVIDER NOTE - PSH
Bibi, NP KAROLYN Sethi Moy PARR CAD (coronary artery disease) of bypass graft  1995  H/O right nephrectomy    Presence of stent in coronary artery  x2

## 2025-03-25 ENCOUNTER — APPOINTMENT (OUTPATIENT)
Dept: ORTHOPEDIC SURGERY | Facility: CLINIC | Age: 60
End: 2025-03-25
Payer: COMMERCIAL

## 2025-03-25 DIAGNOSIS — M17.0 BILATERAL PRIMARY OSTEOARTHRITIS OF KNEE: ICD-10-CM

## 2025-03-25 PROCEDURE — 99214 OFFICE O/P EST MOD 30 MIN: CPT | Mod: 25

## 2025-03-25 PROCEDURE — 20610 DRAIN/INJ JOINT/BURSA W/O US: CPT | Mod: 50

## 2025-03-25 RX ORDER — LIDOCAINE 5% 700 MG/1
5 PATCH TOPICAL
Qty: 14 | Refills: 0 | Status: ACTIVE | COMMUNITY
Start: 2025-03-25 | End: 1900-01-01

## 2025-03-29 ENCOUNTER — NON-APPOINTMENT (OUTPATIENT)
Age: 60
End: 2025-03-29

## 2025-04-03 NOTE — PHYSICAL EXAM
[General Appearance - Well Developed] : well developed [General Appearance - Well Nourished] : well nourished [Normal Appearance] : normal appearance [Well Groomed] : well groomed [General Appearance - In No Acute Distress] : no acute distress [Respiration, Rhythm And Depth] : normal respiratory rhythm and effort [Exaggerated Use Of Accessory Muscles For Inspiration] : no accessory muscle use [Abdomen Soft] : soft [Abdomen Tenderness] : non-tender [Costovertebral Angle Tenderness] : no ~M costovertebral angle tenderness [Urinary Bladder Findings] : the bladder was normal on palpation [Normal Station and Gait] : the gait and station were normal for the patient's age [] : no rash [No Focal Deficits] : no focal deficits [Oriented To Time, Place, And Person] : oriented to person, place, and time [Affect] : the affect was normal [Mood] : the mood was normal [Not Anxious] : not anxious No [No Palpable Adenopathy] : no palpable adenopathy [FreeTextEntry1] : morbidly obese, no scars, no palpable mass

## 2025-05-22 ENCOUNTER — OUTPATIENT (OUTPATIENT)
Dept: OUTPATIENT SERVICES | Facility: HOSPITAL | Age: 60
LOS: 1 days | End: 2025-05-22
Payer: COMMERCIAL

## 2025-05-22 DIAGNOSIS — Z90.5 ACQUIRED ABSENCE OF KIDNEY: Chronic | ICD-10-CM

## 2025-05-22 DIAGNOSIS — I25.810 ATHEROSCLEROSIS OF CORONARY ARTERY BYPASS GRAFT(S) WITHOUT ANGINA PECTORIS: Chronic | ICD-10-CM

## 2025-05-22 DIAGNOSIS — Z12.31 ENCOUNTER FOR SCREENING MAMMOGRAM FOR MALIGNANT NEOPLASM OF BREAST: ICD-10-CM

## 2025-05-22 PROCEDURE — 77063 BREAST TOMOSYNTHESIS BI: CPT

## 2025-05-22 PROCEDURE — 77067 SCR MAMMO BI INCL CAD: CPT

## 2025-05-22 PROCEDURE — 77067 SCR MAMMO BI INCL CAD: CPT | Mod: 26

## 2025-05-22 PROCEDURE — 77063 BREAST TOMOSYNTHESIS BI: CPT | Mod: 26

## 2025-05-23 DIAGNOSIS — Z12.31 ENCOUNTER FOR SCREENING MAMMOGRAM FOR MALIGNANT NEOPLASM OF BREAST: ICD-10-CM

## 2025-07-25 ENCOUNTER — APPOINTMENT (OUTPATIENT)
Dept: ORTHOPEDIC SURGERY | Facility: CLINIC | Age: 60
End: 2025-07-25